# Patient Record
Sex: FEMALE | Race: OTHER | HISPANIC OR LATINO | Employment: OTHER | URBAN - METROPOLITAN AREA
[De-identification: names, ages, dates, MRNs, and addresses within clinical notes are randomized per-mention and may not be internally consistent; named-entity substitution may affect disease eponyms.]

---

## 2019-08-08 ENCOUNTER — HOSPITAL ENCOUNTER (INPATIENT)
Facility: HOSPITAL | Age: 64
LOS: 2 days | Discharge: HOME | DRG: 872 | End: 2019-08-10
Attending: EMERGENCY MEDICINE | Admitting: INTERNAL MEDICINE
Payer: COMMERCIAL

## 2019-08-08 ENCOUNTER — APPOINTMENT (EMERGENCY)
Dept: RADIOLOGY | Facility: HOSPITAL | Age: 64
DRG: 872 | End: 2019-08-08
Payer: COMMERCIAL

## 2019-08-08 ENCOUNTER — APPOINTMENT (INPATIENT)
Dept: CT IMAGING | Facility: HOSPITAL | Age: 64
DRG: 872 | End: 2019-08-08
Payer: COMMERCIAL

## 2019-08-08 DIAGNOSIS — A41.9 SEPSIS (HCC): ICD-10-CM

## 2019-08-08 DIAGNOSIS — N20.1 LEFT URETERAL CALCULUS: ICD-10-CM

## 2019-08-08 DIAGNOSIS — N39.0 UTI (URINARY TRACT INFECTION): Primary | ICD-10-CM

## 2019-08-08 PROBLEM — M79.7 FIBROMYALGIA: Status: ACTIVE | Noted: 2019-08-08

## 2019-08-08 PROBLEM — Z87.39 HISTORY OF OSTEOARTHRITIS: Status: ACTIVE | Noted: 2019-08-08

## 2019-08-08 PROBLEM — J84.10 PULMONARY FIBROSIS (HCC): Status: ACTIVE | Noted: 2019-08-08

## 2019-08-08 PROBLEM — M19.90 OSTEOARTHRITIS: Status: ACTIVE | Noted: 2019-08-08

## 2019-08-08 PROBLEM — Z86.718 HISTORY OF DVT (DEEP VEIN THROMBOSIS): Status: ACTIVE | Noted: 2019-08-08

## 2019-08-08 LAB
ALBUMIN SERPL BCP-MCNC: 3 G/DL (ref 3.5–5)
ALP SERPL-CCNC: 145 U/L (ref 46–116)
ALT SERPL W P-5'-P-CCNC: 29 U/L (ref 12–78)
ANION GAP SERPL CALCULATED.3IONS-SCNC: 17 MMOL/L (ref 4–13)
APTT PPP: 26 SECONDS (ref 23–37)
AST SERPL W P-5'-P-CCNC: 26 U/L (ref 5–45)
BACTERIA UR QL AUTO: ABNORMAL /HPF
BASOPHILS # BLD MANUAL: 0 THOUSAND/UL (ref 0–0.1)
BASOPHILS NFR MAR MANUAL: 0 % (ref 0–1)
BILIRUB SERPL-MCNC: 0.6 MG/DL (ref 0.2–1)
BILIRUB UR QL STRIP: NEGATIVE
BILIRUB UR QL STRIP: NEGATIVE
BUN SERPL-MCNC: 12 MG/DL (ref 5–25)
CALCIUM SERPL-MCNC: 8.9 MG/DL (ref 8.3–10.1)
CHLORIDE SERPL-SCNC: 103 MMOL/L (ref 100–108)
CLARITY UR: ABNORMAL
CLARITY UR: ABNORMAL
CO2 SERPL-SCNC: 19 MMOL/L (ref 21–32)
COLOR UR: ABNORMAL
COLOR UR: YELLOW
CREAT SERPL-MCNC: 1.27 MG/DL (ref 0.6–1.3)
EOSINOPHIL # BLD MANUAL: 0 THOUSAND/UL (ref 0–0.4)
EOSINOPHIL NFR BLD MANUAL: 0 % (ref 0–6)
ERYTHROCYTE [DISTWIDTH] IN BLOOD BY AUTOMATED COUNT: 12.6 % (ref 11.6–15.1)
GFR SERPL CREATININE-BSD FRML MDRD: 45 ML/MIN/1.73SQ M
GLUCOSE SERPL-MCNC: 174 MG/DL (ref 65–140)
GLUCOSE UR STRIP-MCNC: NEGATIVE MG/DL
GLUCOSE UR STRIP-MCNC: NEGATIVE MG/DL
HCT VFR BLD AUTO: 33.6 % (ref 34.8–46.1)
HGB BLD-MCNC: 11.1 G/DL (ref 11.5–15.4)
HGB UR QL STRIP.AUTO: ABNORMAL
HGB UR QL STRIP.AUTO: ABNORMAL
INR PPP: 1.06 (ref 0.84–1.19)
KETONES UR STRIP-MCNC: ABNORMAL MG/DL
KETONES UR STRIP-MCNC: ABNORMAL MG/DL
LACTATE SERPL-SCNC: 1.2 MMOL/L (ref 0.5–2)
LACTATE SERPL-SCNC: 5.8 MMOL/L (ref 0.5–2)
LEUKOCYTE ESTERASE UR QL STRIP: ABNORMAL
LEUKOCYTE ESTERASE UR QL STRIP: ABNORMAL
LYMPHOCYTES # BLD AUTO: 1.45 THOUSAND/UL (ref 0.6–4.47)
LYMPHOCYTES # BLD AUTO: 25 % (ref 14–44)
MCH RBC QN AUTO: 32.8 PG (ref 26.8–34.3)
MCHC RBC AUTO-ENTMCNC: 33 G/DL (ref 31.4–37.4)
MCV RBC AUTO: 99 FL (ref 82–98)
MONOCYTES # BLD AUTO: 0.06 THOUSAND/UL (ref 0–1.22)
MONOCYTES NFR BLD: 1 % (ref 4–12)
NEUTROPHILS # BLD MANUAL: 4.28 THOUSAND/UL (ref 1.85–7.62)
NEUTS BAND NFR BLD MANUAL: 10 % (ref 0–8)
NEUTS SEG NFR BLD AUTO: 64 % (ref 43–75)
NITRITE UR QL STRIP: NEGATIVE
NITRITE UR QL STRIP: NEGATIVE
NON-SQ EPI CELLS URNS QL MICRO: ABNORMAL /HPF
NRBC BLD AUTO-RTO: 0 /100 WBCS
PH UR STRIP.AUTO: 5.5 [PH] (ref 4.5–8)
PH UR STRIP.AUTO: 6 [PH]
PLATELET # BLD AUTO: 160 THOUSANDS/UL (ref 149–390)
PLATELET BLD QL SMEAR: ADEQUATE
PMV BLD AUTO: 9.8 FL (ref 8.9–12.7)
POTASSIUM SERPL-SCNC: 3.6 MMOL/L (ref 3.5–5.3)
PROCALCITONIN SERPL-MCNC: 0.59 NG/ML
PROT SERPL-MCNC: 8.7 G/DL (ref 6.4–8.2)
PROT UR STRIP-MCNC: ABNORMAL MG/DL
PROT UR STRIP-MCNC: ABNORMAL MG/DL
PROTHROMBIN TIME: 13.2 SECONDS (ref 11.6–14.5)
RBC # BLD AUTO: 3.38 MILLION/UL (ref 3.81–5.12)
RBC #/AREA URNS AUTO: ABNORMAL /HPF
RBC MORPH BLD: NORMAL
SODIUM SERPL-SCNC: 139 MMOL/L (ref 136–145)
SP GR UR STRIP.AUTO: 1.02 (ref 1–1.03)
SP GR UR STRIP.AUTO: >=1.03 (ref 1–1.03)
TOTAL CELLS COUNTED SPEC: 100
UROBILINOGEN UR QL STRIP.AUTO: 0.2 E.U./DL
UROBILINOGEN UR QL STRIP.AUTO: 0.2 E.U./DL
WBC # BLD AUTO: 5.78 THOUSAND/UL (ref 4.31–10.16)
WBC #/AREA URNS AUTO: ABNORMAL /HPF

## 2019-08-08 PROCEDURE — 85730 THROMBOPLASTIN TIME PARTIAL: CPT | Performed by: EMERGENCY MEDICINE

## 2019-08-08 PROCEDURE — 81001 URINALYSIS AUTO W/SCOPE: CPT | Performed by: EMERGENCY MEDICINE

## 2019-08-08 PROCEDURE — 85027 COMPLETE CBC AUTOMATED: CPT | Performed by: EMERGENCY MEDICINE

## 2019-08-08 PROCEDURE — 80053 COMPREHEN METABOLIC PANEL: CPT | Performed by: EMERGENCY MEDICINE

## 2019-08-08 PROCEDURE — 36415 COLL VENOUS BLD VENIPUNCTURE: CPT | Performed by: EMERGENCY MEDICINE

## 2019-08-08 PROCEDURE — 96360 HYDRATION IV INFUSION INIT: CPT

## 2019-08-08 PROCEDURE — 83605 ASSAY OF LACTIC ACID: CPT | Performed by: EMERGENCY MEDICINE

## 2019-08-08 PROCEDURE — 99223 1ST HOSP IP/OBS HIGH 75: CPT | Performed by: INTERNAL MEDICINE

## 2019-08-08 PROCEDURE — 84145 PROCALCITONIN (PCT): CPT | Performed by: EMERGENCY MEDICINE

## 2019-08-08 PROCEDURE — 85007 BL SMEAR W/DIFF WBC COUNT: CPT | Performed by: EMERGENCY MEDICINE

## 2019-08-08 PROCEDURE — 85610 PROTHROMBIN TIME: CPT | Performed by: EMERGENCY MEDICINE

## 2019-08-08 PROCEDURE — 87040 BLOOD CULTURE FOR BACTERIA: CPT | Performed by: EMERGENCY MEDICINE

## 2019-08-08 PROCEDURE — 87186 SC STD MICRODIL/AGAR DIL: CPT | Performed by: EMERGENCY MEDICINE

## 2019-08-08 PROCEDURE — 87086 URINE CULTURE/COLONY COUNT: CPT | Performed by: EMERGENCY MEDICINE

## 2019-08-08 PROCEDURE — 87077 CULTURE AEROBIC IDENTIFY: CPT | Performed by: EMERGENCY MEDICINE

## 2019-08-08 PROCEDURE — 99285 EMERGENCY DEPT VISIT HI MDM: CPT | Performed by: EMERGENCY MEDICINE

## 2019-08-08 PROCEDURE — 74177 CT ABD & PELVIS W/CONTRAST: CPT

## 2019-08-08 PROCEDURE — 99285 EMERGENCY DEPT VISIT HI MDM: CPT

## 2019-08-08 PROCEDURE — 71046 X-RAY EXAM CHEST 2 VIEWS: CPT

## 2019-08-08 RX ORDER — SODIUM CHLORIDE 9 MG/ML
75 INJECTION, SOLUTION INTRAVENOUS CONTINUOUS
Status: DISCONTINUED | OUTPATIENT
Start: 2019-08-08 | End: 2019-08-10

## 2019-08-08 RX ORDER — ACETAMINOPHEN 325 MG/1
975 TABLET ORAL ONCE
Status: COMPLETED | OUTPATIENT
Start: 2019-08-08 | End: 2019-08-08

## 2019-08-08 RX ORDER — ONDANSETRON 2 MG/ML
4 INJECTION INTRAMUSCULAR; INTRAVENOUS EVERY 8 HOURS PRN
Status: DISCONTINUED | OUTPATIENT
Start: 2019-08-08 | End: 2019-08-10 | Stop reason: HOSPADM

## 2019-08-08 RX ADMIN — IODIXANOL 100 ML: 320 INJECTION, SOLUTION INTRAVASCULAR at 21:57

## 2019-08-08 RX ADMIN — CEFTRIAXONE SODIUM 1000 MG: 10 INJECTION, POWDER, FOR SOLUTION INTRAVENOUS at 20:35

## 2019-08-08 RX ADMIN — IOHEXOL 50 ML: 240 INJECTION, SOLUTION INTRATHECAL; INTRAVASCULAR; INTRAVENOUS; ORAL at 21:57

## 2019-08-08 RX ADMIN — ACETAMINOPHEN 975 MG: 325 TABLET ORAL at 16:56

## 2019-08-08 RX ADMIN — SODIUM CHLORIDE 1000 ML: 0.9 INJECTION, SOLUTION INTRAVENOUS at 16:52

## 2019-08-08 RX ADMIN — SODIUM CHLORIDE 75 ML/HR: 0.9 INJECTION, SOLUTION INTRAVENOUS at 20:39

## 2019-08-08 RX ADMIN — CEFTRIAXONE SODIUM 1000 MG: 10 INJECTION, POWDER, FOR SOLUTION INTRAVENOUS at 18:17

## 2019-08-08 RX ADMIN — SODIUM CHLORIDE 1000 ML: 0.9 INJECTION, SOLUTION INTRAVENOUS at 19:22

## 2019-08-08 NOTE — ASSESSMENT & PLAN NOTE
-con't Esbriet (267mg) 2 tabs tid  -pt follows with Dr Sandhu Neighbor (pulm); follow up out patient

## 2019-08-08 NOTE — PROGRESS NOTES
Progress Note - Lorna Whatley 1955, 59 y o  female MRN: [id number]    Unit/Bed#: -01 Encounter: [id number]    Primary Care Provider: Nayla Dickerson MD   Date and time admitted to hospital: 8/8/2019  4:25 PM        * Sepsis due to Pyelonephritis vs UTI  Assessment & Plan  -pt presents with worsening L  Flank pain; T 103, , RR 22  -UA: large leukocytes, mod blood, ketones, protein, cloudy  -LA 5 8, con't to trend  -Sepsis protocol intiated; pt given 2L NS in ED and started on Rocephin IV 1g (with noted improvement in symptoms)  -Costovertebral tenderness positive on PE --> pending Abd/Pel CT with contrast  -con't IVF, con't IV Rocephin  History of DVT with PE  Assessment & Plan  -LLE DVT and Pulmonary embolism in 2013  -pt denies any SOB  -con't Lovenox for DVT ppx    Pulmonary fibrosis (HCC)  Assessment & Plan  -con't Esbriet (267mg) 2 tabs tid  -pt follows with Dr Rohit Harden (pul); follow up out patient    Osteoarthritis  Assessment & Plan  -hx of b/l hip replacement (R  2014, L  08/2018)  -con't home meds (Alendronate, Vit D)  -follow up with out patient Rheumatologist (Dr Yung Headley? ? )  Fibromyalgia  Assessment & Plan  - pt complains of chronic Lower extremity pain (rule out DVT?)  - con't with home meds; monitor       VTE Prophylaxis: Enoxaparin (Lovenox)  / sequential compression device   Code Status: Full Code  POLST: No discussed  Discussion with family: Yes    Anticipated Length of Stay:  Patient will be admitted on an Inpatient basis with an anticipated length of stay of  1- 2 midnights  Justification for Hospital Stay: Sepsis    Total Time for Visit, including Counseling / Coordination of Care: 45 minutes  Greater than 50% of this total time spent on direct patient counseling and coordination of care  Chief Complaint:   Left flank pain    History of Present Illness:    Lorna Whatley is a 59 y o  female with a PMH of pulm   Fibrosis, OA, osteoperosis, fibromyalgia, pneumonia and DVT with PE (2013) who presented to the ED with a cc of Left sided flank pain  The pain started last Wed, was intermittent with a milder intensity (3/10) and associated with a fever of 102F on Friday  This morning, she work up with worsening pain 10/10, that was constant, non-radiating and assoc with fever, chills and tremors  She denies any urinary urgency/frequency/pain/hematuria  Review of Systems:    Review of Systems   Constitutional: Positive for chills and fever  Negative for diaphoresis  HENT: Negative for congestion and sore throat  Eyes: Negative for photophobia  Respiratory: Positive for cough  Negative for chest tightness, shortness of breath and wheezing  Cardiovascular: Positive for leg swelling  Negative for chest pain  Mildly on the LLE   Gastrointestinal: Negative for abdominal pain, constipation, diarrhea, nausea and vomiting  Genitourinary: Positive for flank pain  Negative for dysuria, frequency, hematuria, pelvic pain and urgency  Musculoskeletal: Positive for arthralgias  Neurological: Negative for dizziness, weakness, light-headedness and headaches  Hematological: Does not bruise/bleed easily  Psychiatric/Behavioral: Negative for confusion  Past Medical and Surgical History:     Past Medical History:   Diagnosis Date    Asthma        No past surgical history on file      Meds/Allergies:    Prior to Admission medications    Not on File     Able to review some of pts home meds, see A&P    Allergies: No Known Allergies    Social History:     Marital Status: Single   Occupation: Day care worker  Patient Pre-hospital Living Situation: Lives with   Patient Pre-hospital Level of Mobility: Ambulates independently  Patient Pre-hospital Diet Restrictions: None  Substance Use History:   Social History     Substance and Sexual Activity   Alcohol Use Not Currently    Frequency: Never     Social History     Tobacco Use   Smoking Status Never Smoker   Smokeless Tobacco Never Used     Social History     Substance and Sexual Activity   Drug Use Never       Family History:    non-contributory    Physical Exam:     Vitals:   Blood Pressure: 121/57 (08/08/19 1853)  Pulse: 92 (08/08/19 1853)  Temperature: 99 6 °F (37 6 °C) (08/08/19 1853)  Temp Source: Oral (08/08/19 1853)  Respirations: 18 (08/08/19 1853)  Height: 5' 3" (160 cm) (08/08/19 1853)  Weight - Scale: 62 8 kg (138 lb 7 2 oz) (08/08/19 1853)  SpO2: 94 % (08/08/19 1853)    Physical Exam   Constitutional: She is oriented to person, place, and time  She appears well-developed and well-nourished  HENT:   Head: Normocephalic and atraumatic  Eyes: Conjunctivae and EOM are normal    Neck: Normal range of motion  Neck supple  Cardiovascular:   tachycardia   Pulmonary/Chest: Effort normal and breath sounds normal    Abdominal: Soft  Bowel sounds are normal    Musculoskeletal: She exhibits edema  Trace pitting edema on RLE, tenderness to light palpation, pulses intact b/l  Neurological: She is alert and oriented to person, place, and time  Skin: Skin is warm and dry  Capillary refill takes less than 2 seconds  Psychiatric: She has a normal mood and affect  Additional Data:     Lab Results: I have personally reviewed pertinent reports        Results from last 7 days   Lab Units 08/08/19  1645   WBC Thousand/uL 5 78   HEMOGLOBIN g/dL 11 1*   HEMATOCRIT % 33 6*   PLATELETS Thousands/uL 160   BANDS PCT % 10*   LYMPHO PCT % 25   MONO PCT % 1*   EOS PCT % 0     Results from last 7 days   Lab Units 08/08/19  1645   SODIUM mmol/L 139   POTASSIUM mmol/L 3 6   CHLORIDE mmol/L 103   CO2 mmol/L 19*   BUN mg/dL 12   CREATININE mg/dL 1 27   ANION GAP mmol/L 17*   CALCIUM mg/dL 8 9   ALBUMIN g/dL 3 0*   TOTAL BILIRUBIN mg/dL 0 60   ALK PHOS U/L 145*   ALT U/L 29   AST U/L 26   GLUCOSE RANDOM mg/dL 174*     Results from last 7 days   Lab Units 08/08/19  1645   INR  1 06             Results from last 7 days   Lab Units 08/08/19  1902 08/08/19  1645   LACTIC ACID mmol/L 1 2 5 8*       Imaging: I have personally reviewed pertinent reports  XR chest 2 views    (Results Pending)   CT abdomen pelvis w contrast    (Results Pending)       EKG, Pathology, and Other Studies Reviewed on Admission:   · EKG: wnl    Allscripts / Epic Records Reviewed: Yes     ** Please Note: This note has been constructed using a voice recognition system   **

## 2019-08-08 NOTE — LETTER
Nathaly Banegas [facility] [address]  Dept: [phone]    August 10, 2019     Patient: Fabricio Mireles   YOB: 1955   Date of Visit: 8/8/2019       To Whom it May Concern:    Fabricio Mireles is under my professional care  She was seen in the hospital from 8/8/2019   to 08/10/19  She may return to work on 08/17/19 without limitations  If you have any questions or concerns, please don't hesitate to call           Sincerely,          Gambia C Holter, DO

## 2019-08-08 NOTE — ASSESSMENT & PLAN NOTE
-pt presents with worsening L  Flank pain; T 103, , RR 22  -UA: large leukocytes, mod blood, ketones, protein, cloudy  -LA 5 8, con't to trend  -Sepsis protocol intiated; pt given 2L NS in ED and started on Rocephin IV 1g (with noted improvement in symptoms)  -Costovertebral tenderness positive on PE --> pending Abd/Pel CT with contrast  -con't IVF, con't IV Rocephin

## 2019-08-08 NOTE — ASSESSMENT & PLAN NOTE
-hx of b/l hip replacement (R  2014, L  08/2018)  -con't home meds (Alendronate, Vit D)  -follow up with out patient Rheumatologist (Dr Franco Collazo? ? )

## 2019-08-08 NOTE — ED PROVIDER NOTES
History  Chief Complaint   Patient presents with    Flank Pain     Pt  with left flank pain, fever, chills, and tremors since approx  3 am  Denies urinary complaints  58 y/o female presents today with fever and left flank pain which started this morning around 3am   Pain does not radiate  No exacerbating or alleviating factors  Denies urinary symptoms  Has not taken anything for her pain  Temp was 103 at triage  History provided by:  Patient  Flank Pain   Pain location:  L flank  Pain quality: aching and cramping    Pain radiates to:  Does not radiate  Pain severity:  Moderate  Onset quality:  Sudden  Duration:  12 hours  Timing:  Constant  Progression:  Worsening  Chronicity:  New  Context: awakening from sleep    Context: not suspicious food intake    Relieved by:  None tried  Worsened by:  Nothing  Ineffective treatments:  None tried      None       Past Medical History:   Diagnosis Date    Asthma        No past surgical history on file  No family history on file  I have reviewed and agree with the history as documented  Social History     Tobacco Use    Smoking status: Never Smoker    Smokeless tobacco: Never Used   Substance Use Topics    Alcohol use: Not Currently     Frequency: Never    Drug use: Never        Review of Systems   Genitourinary: Positive for flank pain  Physical Exam  Physical Exam   Constitutional: She is oriented to person, place, and time  She appears well-developed and well-nourished  She appears distressed (appears uncomfortable)  HENT:   Head: Normocephalic and atraumatic  Mouth/Throat: Uvula is midline, oropharynx is clear and moist and mucous membranes are normal  No tonsillar exudate  Eyes: Pupils are equal, round, and reactive to light  Neck: Normal range of motion  Neck supple  Cardiovascular: Regular rhythm  Mildly tachycardic at 110 bpm   Pulmonary/Chest: Effort normal and breath sounds normal    Abdominal: Soft   Bowel sounds are normal  There is no tenderness  There is no rebound and no guarding  Musculoskeletal: She exhibits no edema, tenderness or deformity  Neurological: She is alert and oriented to person, place, and time  Patient moving all extremities equally, no focal neuro deficits noted  Skin: Skin is warm and dry  Capillary refill takes less than 2 seconds  Psychiatric: She has a normal mood and affect  Nursing note and vitals reviewed        Vital Signs  ED Triage Vitals [08/08/19 1633]   Temperature Pulse Respirations Blood Pressure SpO2   (!) 103 °F (39 4 °C) (!) 127 22 (!) 181/74 96 %      Temp Source Heart Rate Source Patient Position - Orthostatic VS BP Location FiO2 (%)   Oral Monitor Sitting Right arm --      Pain Score       8           Vitals:    08/08/19 1633 08/08/19 1730 08/08/19 1824 08/08/19 1853   BP: (!) 181/74 138/61 143/83 121/57   Pulse: (!) 127 94 97 92   Patient Position - Orthostatic VS: Sitting Lying  Lying         Visual Acuity      ED Medications  Medications   cefTRIAXone (ROCEPHIN) 1,000 mg in dextrose 5 % 50 mL IVPB (has no administration in time range)   sodium chloride 0 9 % bolus 1,000 mL (has no administration in time range)     Followed by   sodium chloride 0 9 % bolus 1,000 mL (has no administration in time range)   enoxaparin (LOVENOX) subcutaneous injection 40 mg (has no administration in time range)   iohexol (OMNIPAQUE) 240 MG/ML solution 50 mL (has no administration in time range)   sodium chloride 0 9 % bolus 1,000 mL (1,000 mL Intravenous New Bag 8/8/19 1652)     Followed by   sodium chloride 0 9 % bolus 1,000 mL (1,000 mL Intravenous Bolus 8/8/19 1922)   acetaminophen (TYLENOL) tablet 975 mg (975 mg Oral Given 8/8/19 1656)   ceftriaxone (ROCEPHIN) 1 g/50 mL in dextrose IVPB (1,000 mg Intravenous New Bag 8/8/19 1817)       Diagnostic Studies  Results Reviewed     Procedure Component Value Units Date/Time    Lactic acid x2 [[id number]]  (Normal) Collected:  08/08/19 1902    Lab Status:  Final result Specimen:  Blood from Arm, Left Updated:  08/08/19 1933     LACTIC ACID 1 2 mmol/L     Narrative:       Result may be elevated if tourniquet was used during collection  Urine Microscopic [[id number]]  (Abnormal) Collected:  08/08/19 1754    Lab Status:  Final result Specimen:  Urine, Clean Catch Updated:  08/08/19 1819     RBC, UA       Field obscured, unable to enumerate     /hpf     WBC, UA Innumerable /hpf      Epithelial Cells Occasional /hpf      Bacteria, UA Moderate /hpf     Urine culture [[id number]] Collected:  08/08/19 1754    Lab Status: In process Specimen:  Urine, Clean Catch Updated:  08/08/19 1819    UA w Reflex to Microscopic w Reflex to Culture [[id number]]  (Abnormal) Collected:  08/08/19 1754    Lab Status:  Final result Specimen:  Urine, Clean Catch Updated:  08/08/19 1807     Color, UA Yellow     Clarity, UA Slightly Cloudy     Specific Gravity, UA >=1 030     pH, UA 6 0     Leukocytes, UA Large     Nitrite, UA Negative     Protein, UA 30 (1+) mg/dl      Glucose, UA Negative mg/dl      Ketones, UA 15 (1+) mg/dl      Urobilinogen, UA 0 2 E U /dl      Bilirubin, UA Negative     Blood, UA Moderate    ED Urine Macroscopic [[id number]]  (Abnormal) Collected:  08/08/19 1802    Lab Status:  Final result Specimen:  Urine Updated:  08/08/19 1751     Color, UA Elvi     Clarity, UA Cloudy     pH, UA 5 5     Leukocytes, UA Large     Nitrite, UA Negative     Protein,  (2+) mg/dl      Glucose, UA Negative mg/dl      Ketones, UA 40 (2+) mg/dl      Urobilinogen, UA 0 2 E U /dl      Bilirubin, UA Negative     Blood, UA Moderate     Specific Gravity, UA 1 025    Narrative:       CLINITEK RESULT    Lactic acid x2 [[id number]]  (Abnormal) Collected:  08/08/19 1645    Lab Status:  Final result Specimen:  Blood from Arm, Right Updated:  08/08/19 1731     LACTIC ACID 5 8 mmol/L     Narrative:       Result may be elevated if tourniquet was used during collection      CBC and differential [[id number]]  (Abnormal) Collected:  08/08/19 1645    Lab Status:  Final result Specimen:  Blood from Arm, Right Updated:  08/08/19 1724     WBC 5 78 Thousand/uL      RBC 3 38 Million/uL      Hemoglobin 11 1 g/dL      Hematocrit 33 6 %      MCV 99 fL      MCH 32 8 pg      MCHC 33 0 g/dL      RDW 12 6 %      MPV 9 8 fL      Platelets 748 Thousands/uL      nRBC 0 /100 WBCs     Narrative: This is an appended report  These results have been appended to a previously verified report      Comprehensive metabolic panel [[id number]]  (Abnormal) Collected:  08/08/19 1645    Lab Status:  Final result Specimen:  Blood from Arm, Right Updated:  08/08/19 1714     Sodium 139 mmol/L      Potassium 3 6 mmol/L      Chloride 103 mmol/L      CO2 19 mmol/L      ANION GAP 17 mmol/L      BUN 12 mg/dL      Creatinine 1 27 mg/dL      Glucose 174 mg/dL      Calcium 8 9 mg/dL      AST 26 U/L      ALT 29 U/L      Alkaline Phosphatase 145 U/L      Total Protein 8 7 g/dL      Albumin 3 0 g/dL      Total Bilirubin 0 60 mg/dL      eGFR 45 ml/min/1 73sq m     Narrative:       [place] guidelines for Chronic Kidney Disease (CKD):     Stage 1 with normal or high GFR (GFR > 90 mL/min/1 73 square meters)    Stage 2 Mild CKD (GFR = 60-89 mL/min/1 73 square meters)    Stage 3A Moderate CKD (GFR = 45-59 mL/min/1 73 square meters)    Stage 3B Moderate CKD (GFR = 30-44 mL/min/1 73 square meters)    Stage 4 Severe CKD (GFR = 15-29 mL/min/1 73 square meters)    Stage 5 End Stage CKD (GFR <15 mL/min/1 73 square meters)  Note: GFR calculation is accurate only with a steady state creatinine    Protime-INR [[id number]]  (Normal) Collected:  08/08/19 1645    Lab Status:  Final result Specimen:  Blood from Arm, Right Updated:  08/08/19 1708     Protime 13 2 seconds      INR 1 06    APTT [[id number]]  (Normal) Collected:  08/08/19 1645    Lab Status:  Final result Specimen:  Blood from Arm, Right Updated:  08/08/19 1708     PTT 26 seconds     Blood culture #1 [[id number]] Collected:  08/08/19 1657    Lab Status: In process Specimen:  Blood from Hand, Left Updated:  08/08/19 1700    Procalcitonin [[id number]] Collected:  08/08/19 1645    Lab Status: In process Specimen:  Blood from Arm, Right Updated:  08/08/19 1654    Blood culture #2 [[id number]] Collected:  08/08/19 1645    Lab Status: In process Specimen:  Blood from Arm, Right Updated:  08/08/19 1653                 XR chest 2 views    (Results Pending)   CT abdomen pelvis w contrast    (Results Pending)              Procedures  Procedures       ED Course                   Initial Sepsis Screening     [address] Name 08/08/19 1635                Is the patient's history suggestive of a new or worsening infection? (!) Yes (Proceed)  -KD        Suspected source of infection  urinary tract infection  -KD        Are two or more of the following signs & symptoms of infection both present and new to the patient?         Indicate SIRS criteria  Hyperthemia > 38 3C (100 9F); Tachypnea > 20 resp per min; Tachycardia > 90 bpm  -KD        If the answer is yes to both questions, suspicion of sepsis is present          If severe sepsis is present AND tissue hypoperfusion perists in the hour after fluid resuscitation or lactate > 4, the patient meets criteria for SEPTIC SHOCK          Are any of the following organ dysfunction criteria present within 6 hours of suspected infection and SIRS criteria that are NOT considered to be chronic conditions?         Organ dysfunction          Date of presentation of severe sepsis          Time of presentation of severe sepsis          Tissue hypoperfusion persists in the hour after crystalloid fluid administration, evidenced, by either:          Was hypotension present within one hour of the conclusion of crystalloid fluid administration?           Date of presentation of septic shock          Time of presentation of septic shock            User Key  (r) = Recorded By, (t) = Taken By, (c) = Cosigned By    [address] Name Provider Type    WILD Truong DO Physician                  MDM  Number of Diagnoses or Management Options  Sepsis [facility]):   UTI (urinary tract infection):   Diagnosis management comments: 5:38 PM  Lactate 5 8  No leukocytosis but 10% bandemia  Likely urinary source but hasn't urinated yet  Sepsis alert called  1800  UA shows blood and leukocytes  Pain has resolved with tylenol and IVF  Vitals improving with IVF and Tylenol  Likely urosepsis  MDM: Patient presents to the Emergency Department and was diagnosed with acute UTI with sepsis  This is a new problem that will require additional planned work-up in a hospitalized setting  Clinical laboratory testing, radiology imaging, and medical testing (EKG) were ordered  I independently reviewed the radiologic imaging, EKG, and laboratory studies  This case is considered high risk secondary to the above listed disease process that poses a threat to bodily function that requires further diagnostic testing and management which may include the administration of parenteral controlled substances  Discussed with EUGENIO  We had a detailed discussion of the patient's condition and case,  including need for admission  Accepts to his/her service  Bed request/bridging orders placed             Amount and/or Complexity of Data Reviewed  Clinical lab tests: ordered and reviewed  Tests in the medicine section of CPT®: reviewed and ordered  Review and summarize past medical records: yes  Discuss the patient with other providers: yes  Independent visualization of images, tracings, or specimens: yes    Risk of Complications, Morbidity, and/or Mortality  Presenting problems: high  Diagnostic procedures: high  Management options: high    Patient Progress  Patient progress: stable      Disposition  Final diagnoses:   UTI (urinary tract infection)   Sepsis ([facility] [address] )     Time reflects when diagnosis was documented in both MDM as applicable and the Disposition within this note     Time User Action Codes Description Comment    8/8/2019  6:04 PM Mata Davila Add [N39 0] UTI (urinary tract infection)     8/8/2019  6:05 PM Dewar, Karyle Miser Add [A41 9] Sepsis [facility])       ED Disposition     ED Disposition Condition Date/Time Comment    Admit Stable Thu Aug 8, 2019  6:05 PM Case was discussed with EUGENIO and the patient's admission status was agreed to be Admission Status: inpatient status to the service of Dr Char White    Follow-up Information    None         There are no discharge medications for this patient  No discharge procedures on file      ED Provider  Electronically Signed by           Oneil Santana DO  08/08/19 2014

## 2019-08-09 ENCOUNTER — ANESTHESIA (INPATIENT)
Dept: PERIOP | Facility: HOSPITAL | Age: 64
DRG: 872 | End: 2019-08-09
Payer: COMMERCIAL

## 2019-08-09 ENCOUNTER — ANESTHESIA EVENT (INPATIENT)
Dept: PERIOP | Facility: HOSPITAL | Age: 64
DRG: 872 | End: 2019-08-09
Payer: COMMERCIAL

## 2019-08-09 ENCOUNTER — APPOINTMENT (INPATIENT)
Dept: RADIOLOGY | Facility: HOSPITAL | Age: 64
DRG: 872 | End: 2019-08-09
Payer: COMMERCIAL

## 2019-08-09 PROBLEM — N39.0 UTI (URINARY TRACT INFECTION): Status: ACTIVE | Noted: 2019-08-08

## 2019-08-09 PROBLEM — N20.1 LEFT URETERAL CALCULUS: Status: ACTIVE | Noted: 2019-08-08

## 2019-08-09 LAB
ALBUMIN SERPL BCP-MCNC: 2.4 G/DL (ref 3.5–5)
ALP SERPL-CCNC: 121 U/L (ref 46–116)
ALT SERPL W P-5'-P-CCNC: 28 U/L (ref 12–78)
ANION GAP SERPL CALCULATED.3IONS-SCNC: 10 MMOL/L (ref 4–13)
AST SERPL W P-5'-P-CCNC: 25 U/L (ref 5–45)
BILIRUB SERPL-MCNC: 0.2 MG/DL (ref 0.2–1)
BUN SERPL-MCNC: 7 MG/DL (ref 5–25)
CALCIUM SERPL-MCNC: 7.9 MG/DL (ref 8.3–10.1)
CHLORIDE SERPL-SCNC: 109 MMOL/L (ref 100–108)
CO2 SERPL-SCNC: 23 MMOL/L (ref 21–32)
CREAT SERPL-MCNC: 0.8 MG/DL (ref 0.6–1.3)
GFR SERPL CREATININE-BSD FRML MDRD: 78 ML/MIN/1.73SQ M
GLUCOSE SERPL-MCNC: 91 MG/DL (ref 65–140)
POTASSIUM SERPL-SCNC: 4 MMOL/L (ref 3.5–5.3)
PROT SERPL-MCNC: 6.7 G/DL (ref 6.4–8.2)
SODIUM SERPL-SCNC: 142 MMOL/L (ref 136–145)

## 2019-08-09 PROCEDURE — NC001 PR NO CHARGE: Performed by: UROLOGY

## 2019-08-09 PROCEDURE — 80053 COMPREHEN METABOLIC PANEL: CPT | Performed by: PSYCHIATRY & NEUROLOGY

## 2019-08-09 PROCEDURE — 74018 RADEX ABDOMEN 1 VIEW: CPT

## 2019-08-09 PROCEDURE — 0T778DZ DILATION OF LEFT URETER WITH INTRALUMINAL DEVICE, VIA NATURAL OR ARTIFICIAL OPENING ENDOSCOPIC: ICD-10-PCS | Performed by: UROLOGY

## 2019-08-09 PROCEDURE — 52332 CYSTOSCOPY AND TREATMENT: CPT | Performed by: UROLOGY

## 2019-08-09 PROCEDURE — 99232 SBSQ HOSP IP/OBS MODERATE 35: CPT | Performed by: INTERNAL MEDICINE

## 2019-08-09 PROCEDURE — BT171ZZ FLUOROSCOPY OF LEFT URETER USING LOW OSMOLAR CONTRAST: ICD-10-PCS | Performed by: UROLOGY

## 2019-08-09 PROCEDURE — 99254 IP/OBS CNSLTJ NEW/EST MOD 60: CPT | Performed by: NURSE PRACTITIONER

## 2019-08-09 PROCEDURE — C1769 GUIDE WIRE: HCPCS | Performed by: UROLOGY

## 2019-08-09 PROCEDURE — C2617 STENT, NON-COR, TEM W/O DEL: HCPCS | Performed by: UROLOGY

## 2019-08-09 DEVICE — STENT URETERAL 6 FR 26CM INLAY OPTIMA
Type: IMPLANTABLE DEVICE | Site: URETER | Status: NON-FUNCTIONAL
Removed: 2019-09-04

## 2019-08-09 RX ORDER — FENTANYL CITRATE 50 UG/ML
INJECTION, SOLUTION INTRAMUSCULAR; INTRAVENOUS AS NEEDED
Status: DISCONTINUED | OUTPATIENT
Start: 2019-08-09 | End: 2019-08-09 | Stop reason: SURG

## 2019-08-09 RX ORDER — ONDANSETRON 2 MG/ML
4 INJECTION INTRAMUSCULAR; INTRAVENOUS EVERY 4 HOURS PRN
Status: DISCONTINUED | OUTPATIENT
Start: 2019-08-09 | End: 2019-08-09 | Stop reason: HOSPADM

## 2019-08-09 RX ORDER — FENTANYL CITRATE/PF 50 MCG/ML
25 SYRINGE (ML) INJECTION
Status: DISCONTINUED | OUTPATIENT
Start: 2019-08-09 | End: 2019-08-09 | Stop reason: HOSPADM

## 2019-08-09 RX ORDER — MAGNESIUM HYDROXIDE 1200 MG/15ML
LIQUID ORAL AS NEEDED
Status: DISCONTINUED | OUTPATIENT
Start: 2019-08-09 | End: 2019-08-09 | Stop reason: HOSPADM

## 2019-08-09 RX ORDER — ONDANSETRON 2 MG/ML
INJECTION INTRAMUSCULAR; INTRAVENOUS AS NEEDED
Status: DISCONTINUED | OUTPATIENT
Start: 2019-08-09 | End: 2019-08-09 | Stop reason: SURG

## 2019-08-09 RX ORDER — SODIUM CHLORIDE, SODIUM LACTATE, POTASSIUM CHLORIDE, CALCIUM CHLORIDE 600; 310; 30; 20 MG/100ML; MG/100ML; MG/100ML; MG/100ML
INJECTION, SOLUTION INTRAVENOUS CONTINUOUS PRN
Status: DISCONTINUED | OUTPATIENT
Start: 2019-08-09 | End: 2019-08-09 | Stop reason: SURG

## 2019-08-09 RX ORDER — PROPOFOL 10 MG/ML
INJECTION, EMULSION INTRAVENOUS AS NEEDED
Status: DISCONTINUED | OUTPATIENT
Start: 2019-08-09 | End: 2019-08-09 | Stop reason: SURG

## 2019-08-09 RX ORDER — DIPHENHYDRAMINE HYDROCHLORIDE 50 MG/ML
12.5 INJECTION INTRAMUSCULAR; INTRAVENOUS ONCE AS NEEDED
Status: DISCONTINUED | OUTPATIENT
Start: 2019-08-09 | End: 2019-08-09 | Stop reason: HOSPADM

## 2019-08-09 RX ADMIN — SODIUM CHLORIDE 75 ML/HR: 0.9 INJECTION, SOLUTION INTRAVENOUS at 10:57

## 2019-08-09 RX ADMIN — LIDOCAINE HYDROCHLORIDE 60 MG: 20 INJECTION, SOLUTION INTRAVENOUS at 16:58

## 2019-08-09 RX ADMIN — ONDANSETRON 4 MG: 2 INJECTION INTRAMUSCULAR; INTRAVENOUS at 17:02

## 2019-08-09 RX ADMIN — SODIUM CHLORIDE, SODIUM LACTATE, POTASSIUM CHLORIDE, AND CALCIUM CHLORIDE: .6; .31; .03; .02 INJECTION, SOLUTION INTRAVENOUS at 16:53

## 2019-08-09 RX ADMIN — ENOXAPARIN SODIUM 40 MG: 40 INJECTION SUBCUTANEOUS at 09:16

## 2019-08-09 RX ADMIN — CEFTRIAXONE SODIUM 1000 MG: 10 INJECTION, POWDER, FOR SOLUTION INTRAVENOUS at 12:27

## 2019-08-09 RX ADMIN — PROPOFOL 200 MG: 10 INJECTION, EMULSION INTRAVENOUS at 16:58

## 2019-08-09 RX ADMIN — FENTANYL CITRATE 25 MCG: 50 INJECTION INTRAMUSCULAR; INTRAVENOUS at 17:01

## 2019-08-09 RX ADMIN — FENTANYL CITRATE 25 MCG: 50 INJECTION INTRAMUSCULAR; INTRAVENOUS at 17:02

## 2019-08-09 NOTE — QUICK NOTE
Stent placed on the left ureter without difficulty  When she is afebrile she can be discharged home from my standpoint  My office has been contacted to contact her to set her up for an appointment to plan future surgery in the form cystoscopy, left ureteroscopy laser lithotripsy  Discharge instructions left on the chart

## 2019-08-09 NOTE — INTERVAL H&P NOTE
H&P reviewed  After examining the patient I find no changes in the patients condition since the H&P had been written  Plan cysto, left ureteral stent placement- she understands this is a staged procedure  Risks explained, consent obtained

## 2019-08-09 NOTE — PLAN OF CARE
Problem: PAIN - ADULT  Goal: Verbalizes/displays adequate comfort level or baseline comfort level  Description  Interventions:  - Encourage patient to monitor pain and request assistance  - Assess pain using appropriate pain scale  - Administer analgesics based on type and severity of pain and evaluate response  - Implement non-pharmacological measures as appropriate and evaluate response  - Consider cultural and social influences on pain and pain management  - Notify physician/advanced practitioner if interventions unsuccessful or patient reports new pain  Outcome: Progressing     Problem: INFECTION - ADULT  Goal: Absence or prevention of progression during hospitalization  Description  INTERVENTIONS:  - Assess and monitor for signs and symptoms of infection  - Monitor lab/diagnostic results  - Monitor all insertion sites, i e  indwelling lines, tubes, and drains  - Monitor endotracheal (as able) and nasal secretions for changes in amount and color  - [place] appropriate cooling/warming therapies per order  - Administer medications as ordered  - Instruct and encourage patient and family to use good hand hygiene technique  - Identify and instruct in appropriate isolation precautions for identified infection/condition  Outcome: Progressing

## 2019-08-09 NOTE — H&P
Please see progress note dated 8/8/19 by Dr Crystal Israel for full H&P    Pt seen and examined personally with Dr Crystal Israel on 8/8/19 820pm  Agree with listed H&P     in addition  Acute Lt flank pain c fever and chills c LA 5 8 - need to r/o ureteral obstruction     O/e AAO*3, nml heart and lung sounds, abdo soft BS+     LA rpt 1 2  Will need CT Abdomen c contrast to r/o obstruction/ pyelo/ abscess  Note Hematuria on UA     Plan - continue IVF and IV Abx

## 2019-08-09 NOTE — UTILIZATION REVIEW
Continued Stay Review    Date: 8/9/2019                        Current Patient Class: inpatient  Current Level of Care: med surg     HPI:64 y o  female initially admitted on 8/8/2019 inpatient due to sepsis due to pyelonephritis vs UTI  CT of the abdomen and pelvis were positive for left obstructing ureteral calculus approximately 4 mm with resultant hydronephrosis  T-max 103° with tachycardia in the 120s  UA positive for infection  IVF and IV antibiotics in progress  Assessment/Plan: today 8/9 Patient to go to OR for left ureteral calculus with left hydronephrosis  IVF, IV antibiotics in progress  Cultures are pending      Procedure scheduled for 1700:  Cystoscopy retrograde pyelogram with insertion stent ureteral (72145 CPT)    Pertinent Labs/Diagnostic Results:   Results from last 7 days   Lab Units 08/08/19  1645   WBC Thousand/uL 5 78   HEMOGLOBIN g/dL 11 1*   HEMATOCRIT % 33 6*   PLATELETS Thousands/uL 160   BANDS PCT % 10*     Results from last 7 days   Lab Units 08/09/19  0533 08/08/19  1645   SODIUM mmol/L 142 139   POTASSIUM mmol/L 4 0 3 6   CHLORIDE mmol/L 109* 103   CO2 mmol/L 23 19*   ANION GAP mmol/L 10 17*   BUN mg/dL 7 12   CREATININE mg/dL 0 80 1 27   EGFR ml/min/1 73sq m 78 45   CALCIUM mg/dL 7 9* 8 9     Results from last 7 days   Lab Units 08/09/19  0533 08/08/19  1645   AST U/L 25 26   ALT U/L 28 29   ALK PHOS U/L 121* 145*   TOTAL PROTEIN g/dL 6 7 8 7*   ALBUMIN g/dL 2 4* 3 0*   TOTAL BILIRUBIN mg/dL 0 20 0 60         Results from last 7 days   Lab Units 08/09/19  0533 08/08/19  1645   GLUCOSE RANDOM mg/dL 91 174*     Results from last 7 days   Lab Units 08/08/19  1645   PROTIME seconds 13 2   INR  1 06   PTT seconds 26     Results from last 7 days   Lab Units 08/08/19  1645   PROCALCITONIN ng/ml 0 59*     Results from last 7 days   Lab Units 08/08/19  1902 08/08/19  1645   LACTIC ACID mmol/L 1 2 5 8*     Results from last 7 days   Lab Units 08/08/19  1802 08/08/19  1754   CLARITY UA Cloudy Slightly Cloudy   COLOR UA  Elvi Yellow   SPEC GRAV UA  1 025 >=1 030   PH UA  5 5 6 0   GLUCOSE UA mg/dl Negative Negative   KETONES UA mg/dl 40 (2+)* 15 (1+)*   BLOOD UA  Moderate* Moderate*   PROTEIN UA mg/dl 100 (2+)* 30 (1+)*   NITRITE UA  Negative Negative   BILIRUBIN UA  Negative Negative   UROBILINOGEN UA E U /dl 0 2 0 2   LEUKOCYTES UA  Large* Large*   WBC UA /hpf  --  Innumerable*   RBC UA /hpf  --  Field obscured, unable to enumerate*   BACTERIA UA /hpf  --  Moderate*   EPITHELIAL CELLS WET PREP /hpf  --  Occasional     Results from last 7 days   Lab Units 08/08/19  1645   TOTAL COUNTED  100     Vital Signs:   08/09/19 0754  98 1 °F (36 7 °C)  78  18  129/61  88  97 %  None (Room air)       Medications:   Scheduled Meds:   Current Facility-Administered Medications:  cefTRIAXone 1,000 mg Intravenous Once 1200   enoxaparin 40 mg Subcutaneous Daily    ondansetron 4 mg Intravenous Q8H PRN    sodium chloride 75 mL/hr Intravenous Continuous Last Rate: 75 mL/hr (08/09/19 1057)       Discharge Plan: to be determined  Network Utilization Review Department  Phone: [phone]; Fax [phone]  [email or web address]  ATTENTION: Please call with any questions or concerns to [phone]  and carefully listen to the prompts so that you are directed to the right person  Send all requests for admission clinical reviews, approved or denied determinations and any other requests to fax [phone]   All voicemails are confidential

## 2019-08-09 NOTE — UTILIZATION REVIEW
Initial Clinical Review    Admission: Date/Time/Statement: 8/8/19 @ 1806     Orders Placed This Encounter   Procedures    Inpatient Admission (expected length of stay for this patient Order details is greater than two midnights)     Standing Status:   Standing     Number of Occurrences:   1     Order Specific Question:   Admitting Physician     Answer:   Shwetha Jordan [[id number]]     Order Specific Question:   Level of Care     Answer:   Med Surg [[id number]]     Order Specific Question:   Estimated length of stay     Answer:   More than 2 Midnights     Order Specific Question:   Certification     Answer:   I certify that inpatient services are medically necessary for this patient for a duration of greater than two midnights  See H&P and MD Progress Notes for additional information about the patient's course of treatment  ED Arrival Information     Expected Arrival Acuity Means of Arrival Escorted By Service Admission Type    - 8/8/2019 16:21 Urgent Wheelchair Family Member General Medicine Urgent    Arrival Complaint    nausea, chills flank pain        Chief Complaint   Patient presents with    Flank Pain     Pt  with left flank pain, fever, chills, and tremors since approx  3 am  Denies urinary complaints  Assessment/Plan: This is a 59year old female from home to ED admitted as inpatient due to Sepsis due to pyelonephritis vs UTI  Presented with fever and left flank pain starting @ 3 am on 8/8/2019  Febrile to 103  on exam tachycardic  UA innumerable WBC, moderate bacteria and blood, +1 protein and ketones  Lactic acid elevated to 5 8  Wbc 5 78 with 10% bands  procalcitonin to 0 59  Blood and urine cultures done  IVF and IV antibiotics in progress  Urology consulted  On 8/9 CT abdomen showing:   There is a 4 mm proximal left ureteral calculus   There is mild hydronephrosis as well as multiple peripelvic cysts  Leona Cola is delayed enhancement of the left kidney with perinephric stranding and mild enhancement of the urothelium of the proximal ureter and collecting system   These findings are consistent with pyelitis  Seen by urology and for OR today: cystoscopy with left retrograde pyelography and left ureteral stent insertion      ED Triage Vitals [08/08/19 1633]   Temperature Pulse Respirations Blood Pressure SpO2   (!) 103 °F (39 4 °C) (!) 127 22 (!) 181/74 96 %      Temp Source Heart Rate Source Patient Position - Orthostatic VS BP Location FiO2 (%)   Oral Monitor Sitting Right arm --      Pain Score       8        Wt Readings from Last 1 Encounters:   08/08/19 62 8 kg (138 lb 7 2 oz)     Additional Vital Signs:   08/09/19 0754  98 1 °F (36 7 °C)  78  18  129/61  88  97 %  None (Room air)  Lying   08/08/19 2300  98 6 °F (37 °C)  79  18  121/77  110  99 %  None (Room air)  Lying   08/08/19 1853  99 6 °F (37 6 °C)  92  18  121/57  81  94 %  None (Room air)  Lying   08/08/19 1814  99 9 °F (37 7 °C)                     Pertinent Labs/Diagnostic Test Results:   8/9/2019 CT abdomen -  Mild left-sided hydronephrosis secondary to a 4 mm proximal left ureteral calculus with urothelial enhancement in the ureter and collecting system consistent with pyelitis   No definite CT evidence of pyelonephritis  2   Interstitial lung disease  3   Diverticulosis coli    4   Moderate hiatal hernia    8/8/2019  CxR- Peripheral interstitial lung disease   Possibly fibrosis  Results from last 7 days   Lab Units 08/08/19  1645   WBC Thousand/uL 5 78   HEMOGLOBIN g/dL 11 1*   HEMATOCRIT % 33 6*   PLATELETS Thousands/uL 160   BANDS PCT % 10*     Results from last 7 days   Lab Units 08/09/19  0533 08/08/19  1645   SODIUM mmol/L 142 139   POTASSIUM mmol/L 4 0 3 6   CHLORIDE mmol/L 109* 103   CO2 mmol/L 23 19*   ANION GAP mmol/L 10 17*   BUN mg/dL 7 12   CREATININE mg/dL 0 80 1 27   EGFR ml/min/1 73sq m 78 45   CALCIUM mg/dL 7 9* 8 9     Results from last 7 days   Lab Units 08/09/19  0533 08/08/19  1645   AST U/L 25 26   ALT U/L 28 29   ALK PHOS U/L 121* 145*   TOTAL PROTEIN g/dL 6 7 8 7*   ALBUMIN g/dL 2 4* 3 0*   TOTAL BILIRUBIN mg/dL 0 20 0 60         Results from last 7 days   Lab Units 08/09/19  0533 08/08/19  1645   GLUCOSE RANDOM mg/dL 91 174*     Results from last 7 days   Lab Units 08/08/19  1645   PROTIME seconds 13 2   INR  1 06   PTT seconds 26     Results from last 7 days   Lab Units 08/08/19  1645   PROCALCITONIN ng/ml 0 59*     Results from last 7 days   Lab Units 08/08/19  1902 08/08/19  1645   LACTIC ACID mmol/L 1 2 5 8*     Results from last 7 days   Lab Units 08/08/19  1802 08/08/19  1754   CLARITY UA  Cloudy Slightly Cloudy   COLOR UA  Elvi Yellow   SPEC GRAV UA  1 025 >=1 030   PH UA  5 5 6 0   GLUCOSE UA mg/dl Negative Negative   KETONES UA mg/dl 40 (2+)* 15 (1+)*   BLOOD UA  Moderate* Moderate*   PROTEIN UA mg/dl 100 (2+)* 30 (1+)*   NITRITE UA  Negative Negative   BILIRUBIN UA  Negative Negative   UROBILINOGEN UA E U /dl 0 2 0 2   LEUKOCYTES UA  Large* Large*   WBC UA /hpf  --  Innumerable*   RBC UA /hpf  --  Field obscured, unable to enumerate*   BACTERIA UA /hpf  --  Moderate*   EPITHELIAL CELLS WET PREP /hpf  --  Occasional     Results from last 7 days   Lab Units 08/08/19  1645   TOTAL COUNTED  100       ED Treatment: blood and urine cultures   Medication Administration from 08/08/2019 1621 to 08/08/2019 1843       Date/Time Order Dose Route Action Comments     08/08/2019 1652 sodium chloride 0 9 % bolus 1,000 mL 1,000 mL Intravenous New Bag      08/08/2019 1656 acetaminophen (TYLENOL) tablet 975 mg 975 mg Oral Given For fever and pain     08/08/2019 1817 ceftriaxone (ROCEPHIN) 1 g/50 mL in dextrose IVPB 1,000 mg Intravenous New Bag         Past Medical History:   Diagnosis Date    Asthma      Present on Admission:  **None**      Admitting Diagnosis: Nausea [R11 0]  UTI (urinary tract infection) [N39 0]  Sepsis ([address] ) [A41 9]  Age/Sex: 59 y o  female  Admission Orders: 8/8/2019  1806 INPATIENT     Current Facility-Administered Medications:  enoxaparin 40 mg Subcutaneous Daily    ondansetron 4 mg Intravenous Q8H PRN    sodium chloride 75 mL/hr Intravenous Continuous Last Rate: 75 mL/hr (08/08/19 2039)   No prn medication used  NPO      Network Utilization Review Department  Phone: [phone]; Fax [phone]  [email or web address]  ATTENTION: Please call with any questions or concerns to [phone]  and carefully listen to the prompts so that you are directed to the right person  Send all requests for admission clinical reviews, approved or denied determinations and any other requests to fax [phone]   All voicemails are confidential

## 2019-08-09 NOTE — ANESTHESIA POSTPROCEDURE EVALUATION
Post-Op Assessment Note    CV Status:  Stable  Pain Score: 0    Pain management: adequate     Mental Status:  Alert and awake   Hydration Status:  Euvolemic   PONV Controlled:  Controlled   Airway Patency:  Patent   Post Op Vitals Reviewed: Yes      Staff: CRNA           /55 (08/09/19 1717)    Temp 98 5 °F (36 9 °C) (08/09/19 1717)    Pulse 78 (08/09/19 1717)   Resp 20 (08/09/19 1717)    SpO2 97 % (08/09/19 1717)

## 2019-08-09 NOTE — CONSULTS
CONSULT    Patient Name: Tammy Sierra  Patient MRN: [id number]  Date of Service: 8/9/2019   Date / Time Note Created: 8/9/2019 11:09 AM   Referring Provider: Diane Nicolas MD    Provider Creating Note: SIDRA Wadsworth    PCP: Aubree Gilliland  Attending Provider:  Diane Nicolas MD    Reason for Consult: Flank Pain    HPI:  Tammy Sierra is a very pleasant 44-year-old [address] female presenting with acute onset of left flank and abdominal pain since Wednesday  Patient made multiple attempts to self-medicated with only temporary relief  Patient reported to emergency room after she developed fever and chills  Patient denies any accompanying dysuria or gross hematuria  CT of the abdomen and pelvis were positive for left obstructing ureteral calculus approximately 4 mm with resultant hydronephrosis  T-max 103° with tachycardia in the 120s  Blood pressure stable  Patient was given IV fluids for elevated lactic acid and resuscitation  Ceftriaxone initiated  Creatinine and WBC count were within normal limits  Urinalysis was grossly positive for infection  Urologic consultation requested for possible surgical management  Active Problems:    Patient Active Problem List   Diagnosis    Sepsis due to Pyelonephritis vs UTI    Pulmonary fibrosis (HCC)    History of DVT with PE    Osteoarthritis    Fibromyalgia    UTI (urinary tract infection)    Left ureteral calculus            Impressions  Left Ureteral Calculus   Left Hydronephrosis  Renal Colic--secondary to previous  SiRs/sepsis--of  etiology      Recommendations  1  Initiate aggressive IVFs   2  Flomax  3  Analgesia/Narcotics   4  Anti-emetics   5  ATBs empirically while awaiting culture   6  Strain urine   7  NPO  for OR if no spontaneous expulsion achieved  Explained risk, benefits and potential complications of ureteroscopic stone extraction   Patient has verbalized understanding of need for ureteral stent only due to active infection; requiring staged ureteroscopy electively once recovered and infection free as OP  Formal consent by surgeon  Past Medical History:   Diagnosis Date    Asthma        No past surgical history on file  No family history on file      Social History     Socioeconomic History    Marital status: Single     Spouse name: Not on file    Number of children: Not on file    Years of education: Not on file    Highest education level: Not on file   Occupational History    Not on file   Social Needs    Financial resource strain: Not on file    Food insecurity:     Worry: Not on file     Inability: Not on file    Transportation needs:     Medical: Not on file     Non-medical: Not on file   Tobacco Use    Smoking status: Never Smoker    Smokeless tobacco: Never Used   Substance and Sexual Activity    Alcohol use: Not Currently     Frequency: Never    Drug use: Never    Sexual activity: Not on file   Lifestyle    Physical activity:     Days per week: Not on file     Minutes per session: Not on file    Stress: Not on file   Relationships    Social connections:     Talks on phone: Not on file     Gets together: Not on file     Attends Church service: Not on file     Active member of club or organization: Not on file     Attends meetings of clubs or organizations: Not on file     Relationship status: Not on file    Intimate partner violence:     Fear of current or ex partner: Not on file     Emotionally abused: Not on file     Physically abused: Not on file     Forced sexual activity: Not on file   Other Topics Concern    Not on file   Social History Narrative    Not on file       No Known Allergies    Review of Systems  10 point review of systems negative except as noted in HPI  Constitutional:   positive for  - chills, fatigue and fever  Cardiovascular:   no chest pain or dyspnea on exertion  Gastrointestinal:   positive for - abdominal pain and nausea/vomiting  Genito-Urinary:   no dysuria, trouble voiding, or hematuria  Neurological:   no TIA or stroke symptoms     Chart Review   Allergies, current medications, history, problem list    Vital Signs  /61 (BP Location: Left arm)   Pulse 78   Temp 98 1 °F (36 7 °C) (Oral)   Resp 18   Ht 5' 3" (1 6 m)   Wt 62 8 kg (138 lb 7 2 oz)   SpO2 97%   BMI 24 53 kg/m²     Physical Exam  General appearance: alert and oriented, in no acute distress, appears stated age, cooperative and no distress  Head: Normocephalic, without obvious abnormality, atraumatic  Neck: no adenopathy, no carotid bruit, no JVD, supple, symmetrical, trachea midline and thyroid not enlarged, symmetric, no tenderness/mass/nodules  Lungs: clear to auscultation bilaterally  Heart: regular rate and rhythm, S1, S2 normal, no murmur, click, rub or gallop  Abdomen: abnormal findings:  moderate tenderness in the lower abdomen  Extremities: extremities normal, warm and well-perfused; no cyanosis, clubbing, or edema  Pulses: 2+ and symmetric  Neurologic: Grossly normal  No urinary drains     Laboratory Studies  Lab Results   Component Value Date    K 4 0 08/09/2019     (H) 08/09/2019    CO2 23 08/09/2019    CREATININE 0 80 08/09/2019    BUN 7 08/09/2019     Lab Results   Component Value Date    WBC 5 78 08/08/2019    RBC 3 38 (L) 08/08/2019    HGB 11 1 (L) 08/08/2019    HCT 33 6 (L) 08/08/2019    MCV 99 (H) 08/08/2019    MCH 32 8 08/08/2019    RDW 12 6 08/08/2019     08/08/2019         Imaging and Other Studies  )  Xr Chest 2 Views    Result Date: 8/8/2019  Narrative: CHEST INDICATION:   fever  COMPARISON:  None EXAM PERFORMED/VIEWS:  XR CHEST PA & LATERAL FINDINGS: Cardiomediastinal silhouette appears unremarkable  Both lungs demonstrate a predominantly peripheral interstitial pattern of opacities typically seen in the setting of pulmonary fibrosis although this would require correlation with patient's symptoms and history    Other types of chronic interstitial lung  disease could be considered as well  Unlikely to be acute pneumonia  Distribution is not typical for CHF/edema  There is no evidence of mass or pleural fluid or pneumothorax  Osseous structures appear within normal limits for patient age  Impression: Peripheral interstitial lung disease  Possibly fibrosis  Correlate with history and symptoms  Workstation performed: [id number]     Ct Abdomen Pelvis W Contrast    Result Date: 8/9/2019  Narrative: CT ABDOMEN AND PELVIS WITH IV CONTRAST INDICATION:   Left flank pain, fever, chills  COMPARISON:  None  TECHNIQUE:  CT examination of the abdomen and pelvis was performed  Axial, sagittal, and coronal 2D reformatted images were created from the source data and submitted for interpretation  Radiation dose length product (DLP) for this visit:  472 mGy-cm   This examination, like all CT scans performed in the [facility], was performed utilizing techniques to minimize radiation dose exposure, including the use of iterative reconstruction and automated exposure control  IV Contrast:  100 mL of iodixanol (VISIPAQUE) Enteric Contrast:  Enteric contrast was administered  FINDINGS: ABDOMEN LOWER CHEST:  There is reticular interstitial thickening at the lung bases  There is a moderate hiatal hernia  LIVER/BILIARY TREE:  Unremarkable  GALLBLADDER:  No calcified gallstones  No pericholecystic inflammatory change  SPLEEN:  Unremarkable  PANCREAS:  Unremarkable  ADRENAL GLANDS:  Unremarkable  KIDNEYS/URETERS:  There is a 4 mm proximal left ureteral calculus  There is mild hydronephrosis as well as multiple peripelvic cysts  There is delayed enhancement of the left kidney with perinephric stranding and mild enhancement of the urothelium of the proximal ureter and collecting system  These findings are consistent with pyelitis  No definite CT evidence of pyelonephritis no delayed images are more sensitive for cortical edema   STOMACH AND BOWEL:  There is colonic diverticulosis without evidence of acute diverticulitis  APPENDIX:  No findings to suggest appendicitis  ABDOMINOPELVIC CAVITY:  No ascites or free intraperitoneal air  No lymphadenopathy  VESSELS:  Unremarkable for patient's age  PELVIS Limited evaluation due to extensive beam hardening artifact from bilateral hip arthroplasties appear REPRODUCTIVE ORGANS:  Unremarkable for patient's age  URINARY BLADDER:  Unremarkable though portions are obscured by artifact  ABDOMINAL WALL/INGUINAL REGIONS:  Unremarkable  OSSEOUS STRUCTURES:  No acute fracture or destructive osseous lesion  Impression: 1  Mild left-sided hydronephrosis secondary to a 4 mm proximal left ureteral calculus with urothelial enhancement in the ureter and collecting system consistent with pyelitis  No definite CT evidence of pyelonephritis  2   Interstitial lung disease  3   Diverticulosis coli  4   Moderate hiatal hernia  I personally discussed this study with Nacho Cleveland on 8/9/2019 at 7:41 AM  Workstation performed: [id number]       Medications     Current Facility-Administered Medications:  enoxaparin 40 mg Subcutaneous Daily Ronny Clarke MD    ondansetron 4 mg Intravenous Q8H PRN SIDRA Jones    sodium chloride 75 mL/hr Intravenous Continuous Phyllistine MD Osito Last Rate: 75 mL/hr (08/09/19 1056)         Total time spent with patient 25 minutes, >50% spent counseling and/or coordination of care           SIDRA Rushing

## 2019-08-09 NOTE — ASSESSMENT & PLAN NOTE
Previous history of bilateral hip replacement in 2014 and 2018-recommendation to continue outpatient management with rheumatology (Dr Irvin Vargas)  · Continue alendronate and vitamin-D    (Alendronate, Vit D)

## 2019-08-09 NOTE — ASSESSMENT & PLAN NOTE
Previous history of left lower extremity DVT and PE 2013  No complaint of shortness of breath  · Continue Lovenox

## 2019-08-09 NOTE — DISCHARGE INSTRUCTIONS
Expect to have burning urgency and frequency and blood in the urine with the stent  The stent can also cause left flank pain especially when voiding  Call for fever greater than 101 5°, or severe pain not relieved by pain medications  You may resume her usual activities except no driving while taking narcotic pain medications  My office will call you with an appointment to be set up for history and physical to schedule urine next procedure which will be cystoscopy, left ureteroscopy and laser lithotripsy of the stone

## 2019-08-09 NOTE — ASSESSMENT & PLAN NOTE
Initial patient presentation left flank pain, temperature 103°, heart rate 127, respirations 22  Left-sided Costovertebral tenderness noted  UA demonstrated large leukocytes and moderate blood  Lactic acid 5 8-repeat 1 2  CT abdomen/pelvis demonstrated mild left-sided hydronephrosis secondary to 4 mm proximal left ureteral calculus, consistent with pyelitis  · Continue sepsis protocol-normal saline 75 mL/hr and antibiotics  · Continue monitoring vitals  · Urology consult appreciated-analgesia, antiemetics and NPO for ureteroscopic stone extraction (left ureteral calculus)

## 2019-08-09 NOTE — ASSESSMENT & PLAN NOTE
Outpatient pulmonologist Dr Saba-recommendation to continue outpatient management    · Continue Esbriet (267mg) 2 tabs tid

## 2019-08-09 NOTE — ASSESSMENT & PLAN NOTE
· Urology consult appreciated-see below for management  · NPO for ureteral stone extraction in the OR in the a m

## 2019-08-09 NOTE — OP NOTE
Mla Willis    [id number]    Date:  8/9/2019    Preoperative Diagnosis:Pre-Op Diagnosis Codes:     * UTI (urinary tract infection) [N39 0]     * Left ureteral calculus [N20 1]    Postoperative Diagnosis:  Febrile left ureteral stone    Procedure: Cystoscopy, left Ureteral Stent Placement    Surgeon: Mounika Marino MD    First Assistant: None                                                       Resident:None    Anesthesia: General    EBL: Minimal    Specimens:None    Findings:    Complications:None      Course of Procedure: Mal Willis  has been scheduled for cystoscopy and decompressive stenting of the left ureter  The indications for the procedure are febrile 4mm proximal left ureteral stone  The risks of bleeding, infection, damage to the urinary tract and adjacent organs were discussed with the patient and informed consent was given  She understands this is a staged procedure  The patient was brought to the operating room and identified  After general anesthesia was induced, the patient was prepared and draped in the dorsolithotomy position in the usual fashion, with standard care taken to protect pressure points  A time out was performed  Rigid cystoscopy was carried out with a 22 fr cysto sheath with 30 and 70 degree lens  The urethra was normal without stricture  The bladder was smooth, nontrabeculated, and there were no stones, tumors, or other lesions except for cystitis cystica  The 0 035" guide wire was fed into the left ureteral orifice and fed under direct and fluoroscopic guidance into the renal pelvis  The stone could not be seen fluoroscopically  After the wire was placed, a 6 [place] JJ ureteral stent was placed over the wire with no string attached  Coils were established in the renal pelvis and bladder as the wire was withdrawn, confirmed fluoroscopically  The bladder was drained, the patient extubated and transferred to the PACU in good condition

## 2019-08-09 NOTE — PROGRESS NOTES
Urology consultation called this morning  Patient admitted through the emergency room on August 8, 2019 with a fever of 103  A CT scan was performed on the evening of August 8, 2019 revealing a left 4 mm proximal obstructing calculus  The patient was admitted with an elevated lactate which cleared with hydration last evening  Fortunately she is now afebrile  White blood cell count on admission 5 78  Most recent vital signs stable  Maintain patient NPO today for cystoscopy with left retrograde pyelography and left ureteral stent insertion in the OR today  A ureteral stent only will be placed today and she will ultimately require interval ureteroscopy in the future  If the patient were to become septic in the interim, consideration can be given to insertion of left nephrostomy tube  For now will plan for left retrograde with left stent insertion today  Full urologic consultation to follow

## 2019-08-09 NOTE — PROGRESS NOTES
Progress Note - Katie Foss 1955, 59 y o  female MRN: [id number]    Unit/Bed#: -01 Encounter: [id number]    Primary Care Provider: Edith Candelario MD   Date and time admitted to hospital: 8/8/2019  4:25 PM        Left ureteral calculus  Assessment & Plan  · Urology consult appreciated-see below for management  · NPO for ureteral stone extraction in the OR in the a m     UTI (urinary tract infection)  Assessment & Plan  · Continue IV antibiotics  Fibromyalgia  Assessment & Plan  · Complaints of lower extremity pain/discomfort-ruled out PE with history pertinent for previous DVT/PE  Osteoarthritis  Assessment & Plan  Previous history of bilateral hip replacement in 2014 and 2018-recommendation to continue outpatient management with rheumatology (Dr Karen Tello)  · Continue alendronate and vitamin-D  (Alendronate, Vit D)        History of DVT with PE  Assessment & Plan  Previous history of left lower extremity DVT and PE 2013  No complaint of shortness of breath  · Continue Lovenox  Pulmonary fibrosis [facility])  Assessment & Plan  Outpatient pulmonologist Dr Saba-recommendation to continue outpatient management  · Continue Esbriet (267mg) 2 tabs tid    * Sepsis due to Pyelonephritis vs UTI  Assessment & Plan  Initial patient presentation left flank pain, temperature 103°, heart rate 127, respirations 22  Left-sided Costovertebral tenderness noted  UA demonstrated large leukocytes and moderate blood  Lactic acid 5 8-repeat 1 2  CT abdomen/pelvis demonstrated mild left-sided hydronephrosis secondary to 4 mm proximal left ureteral calculus, consistent with pyelitis  · Continue sepsis protocol-normal saline 75 mL/hr and antibiotics  · Continue monitoring vitals  · Urology consult appreciated-analgesia, antiemetics and NPO for ureteroscopic stone extraction (left ureteral calculus)       VTE Pharmacologic Prophylaxis:   Pharmacologic: Enoxaparin (Lovenox)  Mechanical VTE Prophylaxis in Place: Yes    Patient Centered Rounds: I have performed bedside rounds with nursing staff today  Discussions with Specialists or Other Care Team Provider:  Case management and urology  Education and Discussions with Family / Patient:  Patient  Time Spent for Care: 30 minutes  More than 50% of total time spent on counseling and coordination of care as described above  Current Length of Stay: 1 day(s)    Current Patient Status: Inpatient   Certification Statement: The patient will continue to require additional inpatient hospital stay due to Ureteral stone extraction in the OR  Discharge Plan:  Possible patient discharge in 24-48 hours  Code Status: Level 1 - Full Code      Subjective:   No complaints stated by the patient time of exam   She denied fever, chills, rigors, shortness of breath, nausea/vomiting/diarrhea and flank pain  She stated she is tolerating her diet well  Presently, patient NPO for OR in the a   Objective:     Vitals:   Temp (24hrs), Av 8 °F (37 7 °C), Min:98 1 °F (36 7 °C), Max:103 °F (39 4 °C)    Temp:  [98 1 °F (36 7 °C)-103 °F (39 4 °C)] 98 1 °F (36 7 °C)  HR:  [] 78  Resp:  [18-22] 18  BP: (121-181)/(57-83) 129/61  SpO2:  [94 %-99 %] 97 %  Body mass index is 24 53 kg/m²  Input and Output Summary (last 24 hours): Intake/Output Summary (Last 24 hours) at 2019 1434  Last data filed at 2019 1300  Gross per 24 hour   Intake 180 ml   Output    Net 180 ml       Physical Exam:     Physical Exam   Constitutional: She is oriented to person, place, and time  Vital signs are normal  She appears well-developed and well-nourished  She is cooperative  She does not appear ill  No distress  HENT:   Head: Normocephalic and atraumatic  Eyes: Pupils are equal, round, and reactive to light  EOM are normal    Neck: Normal range of motion  Neck supple  Cardiovascular: Normal rate, regular rhythm, normal heart sounds, intact distal pulses and normal pulses  Exam reveals no friction rub  No murmur heard  Pulmonary/Chest: Effort normal and breath sounds normal  No stridor  No respiratory distress  She has no wheezes  Abdominal: Soft  Normal appearance and bowel sounds are normal  She exhibits no distension  There is no tenderness  There is no rigidity and no rebound  Musculoskeletal: Normal range of motion  She exhibits no edema  No costovertebral tenderness or flank pain upon palpation  Neurological: She is alert and oriented to person, place, and time  Skin: Skin is warm and dry  Capillary refill takes less than 2 seconds  She is not diaphoretic  Psychiatric: She has a normal mood and affect  Her speech is normal and behavior is normal  Judgment and thought content normal    Vitals reviewed  Additional Data:     Labs:    Results from last 7 days   Lab Units 08/08/19  1645   WBC Thousand/uL 5 78   HEMOGLOBIN g/dL 11 1*   HEMATOCRIT % 33 6*   PLATELETS Thousands/uL 160   BANDS PCT % 10*   LYMPHO PCT % 25   MONO PCT % 1*   EOS PCT % 0     Results from last 7 days   Lab Units 08/09/19  0533   SODIUM mmol/L 142   POTASSIUM mmol/L 4 0   CHLORIDE mmol/L 109*   CO2 mmol/L 23   BUN mg/dL 7   CREATININE mg/dL 0 80   ANION GAP mmol/L 10   CALCIUM mg/dL 7 9*   ALBUMIN g/dL 2 4*   TOTAL BILIRUBIN mg/dL 0 20   ALK PHOS U/L 121*   ALT U/L 28   AST U/L 25   GLUCOSE RANDOM mg/dL 91     Results from last 7 days   Lab Units 08/08/19  1645   INR  1 06             Results from last 7 days   Lab Units 08/08/19  1902 08/08/19  1645   LACTIC ACID mmol/L 1 2 5 8*   PROCALCITONIN ng/ml  --  0 59*           * I Have Reviewed All Lab Data Listed Above  * Additional Pertinent Lab Tests Reviewed: All Labs For Current Hospital Admission Reviewed    Imaging:    Imaging Reports Reviewed Today Include:  CT abdomen/pelvis  Imaging Personally Reviewed by Myself Includes:  None      Recent Cultures (last 7 days):           Last 24 Hours Medication List:     Current Facility-Administered Medications:  enoxaparin 40 mg Subcutaneous Daily Delilah Gracia MD    ondansetron 4 mg Intravenous Q8H PRN SIDRA Jones    sodium chloride 75 mL/hr Intravenous Continuous Phyllistine MD Osito Last Rate: 75 mL/hr (08/09/19 1057)        Today, Patient Was Seen By: Lonell Evert Holter, DO    ** Please Note: Dictation voice to text software may have been used in the creation of this document   **

## 2019-08-09 NOTE — ASSESSMENT & PLAN NOTE
· Complaints of lower extremity pain/discomfort-ruled out PE with history pertinent for previous DVT/PE

## 2019-08-10 VITALS
OXYGEN SATURATION: 96 % | BODY MASS INDEX: 24.53 KG/M2 | DIASTOLIC BLOOD PRESSURE: 64 MMHG | WEIGHT: 138.45 LBS | HEART RATE: 73 BPM | TEMPERATURE: 99 F | RESPIRATION RATE: 18 BRPM | SYSTOLIC BLOOD PRESSURE: 137 MMHG | HEIGHT: 63 IN

## 2019-08-10 PROBLEM — A41.9 SEPSIS (HCC): Status: RESOLVED | Noted: 2019-08-08 | Resolved: 2019-08-10

## 2019-08-10 LAB
BACTERIA UR CULT: ABNORMAL
BACTERIA UR CULT: ABNORMAL

## 2019-08-10 PROCEDURE — 99239 HOSP IP/OBS DSCHRG MGMT >30: CPT | Performed by: INTERNAL MEDICINE

## 2019-08-10 RX ORDER — CEPHALEXIN 500 MG/1
500 CAPSULE ORAL EVERY 6 HOURS SCHEDULED
Status: DISCONTINUED | OUTPATIENT
Start: 2019-08-10 | End: 2019-08-10 | Stop reason: HOSPADM

## 2019-08-10 RX ORDER — CEPHALEXIN 500 MG/1
500 CAPSULE ORAL EVERY 6 HOURS SCHEDULED
Qty: 15 CAPSULE | Refills: 0 | Status: SHIPPED | OUTPATIENT
Start: 2019-08-10 | End: 2019-08-14

## 2019-08-10 RX ADMIN — SODIUM CHLORIDE 75 ML/HR: 0.9 INJECTION, SOLUTION INTRAVENOUS at 02:13

## 2019-08-10 RX ADMIN — ENOXAPARIN SODIUM 40 MG: 40 INJECTION SUBCUTANEOUS at 08:48

## 2019-08-10 RX ADMIN — CEPHALEXIN 500 MG: 500 CAPSULE ORAL at 11:54

## 2019-08-10 NOTE — ANESTHESIA PREPROCEDURE EVALUATION
Review of Systems/Medical History  Patient summary reviewed        Cardiovascular  Negative cardio ROS    Pulmonary  Negative pulmonary ROS        GI/Hepatic  Negative GI/hepatic ROS          Negative  ROS        Endo/Other  Negative endo/other ROS      GYN  Negative gynecology ROS          Hematology  Negative hematology ROS      Musculoskeletal  Negative musculoskeletal ROS        Neurology  Negative neurology ROS      Psychology   Negative psychology ROS              Physical Exam    Airway    Mallampati score: II  TM Distance: >3 FB  Neck ROM: full     Dental   No notable dental hx     Cardiovascular  Comment: Negative ROS,     Pulmonary      Other Findings        Anesthesia Plan  ASA Score- 2     Anesthesia Type- general with ASA Monitors  Additional Monitors:   Airway Plan: LMA  Comment: Patient seen and examined, history reviewed  Patient to be done under general anesthesia with LMA and routine monitors  Risks discussed with the patient, consent obtained        Plan Factors-    Induction- intravenous  Postoperative Plan-     Informed Consent- Anesthetic plan and risks discussed with patient  I personally reviewed this patient with the CRNA  Discussed and agreed on the Anesthesia Plan with the MELISSA Boyd

## 2019-08-10 NOTE — ASSESSMENT & PLAN NOTE
Previous history of bilateral hip replacement in 2014 and 2018-recommendation to continue outpatient management with rheumatology (Dr Moo Bowens)     · Continue alendronate and vitamin-D

## 2019-08-10 NOTE — DISCHARGE SUMMARY
Discharge- Carson Jamal 1955, 59 y o  female MRN: [id number]    Unit/Bed#: -01 Encounter: [id number]    Primary Care Provider: Steve Espinal MD   Date and time admitted to hospital: 8/8/2019  4:25 PM    Left ureteral calculus  Assessment & Plan  · Urology consult appreciated-see below for management  UTI (urinary tract infection)  Assessment & Plan  · Discontinue IV antibiotics  Fibromyalgia  Assessment & Plan  · Complaints of lower extremity pain/discomfort-ruled out PE with history pertinent for previous DVT/PE  Osteoarthritis  Assessment & Plan  Previous history of bilateral hip replacement in 2014 and 2018-recommendation to continue outpatient management with rheumatology (Dr Magana Ast)  · Continue alendronate and vitamin-D  History of DVT with PE  Assessment & Plan  Previous history of left lower extremity DVT and PE 2013  No complaint of shortness of breath  · Continue Lovenox  Pulmonary fibrosis [facility])  Assessment & Plan  Outpatient pulmonologist Dr Saba-recommendation to continue outpatient management  · Continue Esbriet (267mg) 2 tabs tid    * Sepsis due to Pyelonephritis vs UTI  Assessment & Plan  Initial patient presentation left flank pain, temperature 103°, heart rate 127, respirations 22  Left-sided Costovertebral tenderness noted  UA demonstrated large leukocytes and moderate blood  Lactic acid 5 8-repeat 1 2  CT abdomen/pelvis demonstrated mild left-sided hydronephrosis secondary to 4 mm proximal left ureteral calculus, consistent with pyelitis  · Continue sepsis protocol-normal saline 75 mL/hr  · Continue monitoring vitals  · Urology consult appreciated-cystoscopy with left ureteral stent insertion yesterday  Outpatient laser lithotripsy recommended      Discharging Physician / Practitioner: Bill Oneill DO  PCP: Steve Espinal MD  Admission Date:   Admission Orders (From admission, onward)     Ordered        08/08/19 1806  Inpatient Admission (expected length of stay for this patient Order details is greater than two midnights)  Once                   Discharge Date: 08/10/19    Resolved Problems  Date Reviewed: 8/10/2019    None          Consultations During Hospital Stay:  · Urology  Procedures Performed:   · Cystoscopy with Ureteral stent  Significant Findings / Test Results:   · CBC-See below  · CMP-See below  · Lactic acid-See below  · Procalcitonin-See below  · UA with reflex-See below  · CT abdomen/pelvis-See below  Incidental Findings:   · Diverticulosis coli  · Hiatal hernia  Test Results Pending at Discharge (will require follow up):   · Urine culture  · Blood culture  Outpatient Tests Requested:  · None  Complications:  None  Reason for Admission:  Left flank pain/fever/chills  Hospital Course:     Rachael Caldwell is a 59 y o  female patient that originally presented to [address] [place] [address] ED on 8/8/2019 due to fever, chills and worsening left flank pain described as a nonradiating ache  Initial vitals the ED temperature 103°, heart rate 127, respirations 22, blood pressure 181/74, SpO2 96%  1 g of Rocephin and 0 9% normal saline in addition to Tylenol 975 mg administered in the ED  Lactic acid 1 2-repeat 5 8  Procalcitonin 0 59  CBC demonstrated hemoglobin 11 1 and hematocrit 33 6  CMP demonstrated anion gap 17, glucose 174 and alk-phos 145  Urine microscope demonstrated innumerable white blood cells and moderate bacteria  UA with reflex demonstrated large leukocytes and moderate blood  Patient attended [facility]  CT abdomen pelvis demonstrated left-sided hydronephrosis secondary to 4 mm proximal left ureteral calculus with urothelial enhancement in the urinary collecting system; consistent with pyelitis  See above for incidental findings  Urology consulted  IV antibiotics and IV fluids continued    NPO diet for cystoscopy with left retrograde pyelogram and left ureteral stent insertion  XR a abdomen demonstrated satisfactory appearance of left ureter stent  Outpatient Urology referral in addition to laser lithotripsy recommended  P o  Keflex prescribed to complete 7 day antibiotic course  Patient appropriate for discharge per Urology  Patient vitals temperature 99°, heart rate 73, respirations 18, /64 and SpO2 96%  Discussed discharge plan with patient-she is amenable  Please see above list of diagnoses and related plan for additional information  Condition at Discharge: stable     Discharge Day Visit / Exam:     Subjective:  No complaints stated by patient time of exam   She denied fever, chills, pain/discomfort, nausea/vomiting  Patient endorsed appropriate voiding without presence of dysuria or hematuria  She endorsed appropriate appetite  Patient amenable to discharge plan  Vitals: Blood Pressure: 133/61 (08/09/19 2300)  Pulse: 78 (08/09/19 2300)  Temperature: 98 8 °F (37 1 °C) (08/09/19 2300)  Temp Source: Oral (08/09/19 2300)  Respirations: 18 (08/09/19 2300)  Height: 5' 3" (160 cm) (08/08/19 1853)  Weight - Scale: 62 8 kg (138 lb 7 2 oz) (08/08/19 1853)  SpO2: 98 % (08/09/19 2300)  Exam:   Physical Exam   Constitutional: She is oriented to person, place, and time  Vital signs are normal  She appears well-developed and well-nourished  She is cooperative  She does not appear ill  No distress  HENT:   Head: Normocephalic and atraumatic  Eyes: Pupils are equal, round, and reactive to light  EOM are normal    Neck: Normal range of motion  Neck supple  Cardiovascular: Normal rate, regular rhythm, normal heart sounds, intact distal pulses and normal pulses  Exam reveals no friction rub  No murmur heard  Pulmonary/Chest: Effort normal and breath sounds normal  No stridor  No respiratory distress  She has no wheezes  Abdominal: Soft  Normal appearance and bowel sounds are normal  She exhibits no distension   There is no tenderness  There is no rebound  Musculoskeletal: Normal range of motion  She exhibits no edema  Neurological: She is alert and oriented to person, place, and time  Skin: Skin is warm, dry and intact  Capillary refill takes less than 2 seconds  She is not diaphoretic  Psychiatric: She has a normal mood and affect  Her speech is normal and behavior is normal  Judgment and thought content normal    Nursing note and vitals reviewed  Discussion with Family:  Patient's spouse  Discharge instructions/Information to patient and family:   See after visit summary for information provided to patient and family  Provisions for Follow-Up Care:  See after visit summary for information related to follow-up care and any pertinent home health orders  Disposition:     Home    For Discharges to [facility] SNF:   · Not Applicable to this Patient - Not Applicable to this Patient    Planned Readmission:  None  Discharge Statement:  I spent 35 minutes discharging the patient  This time was spent on the day of discharge  I had direct contact with the patient on the day of discharge  Greater than 50% of the total time was spent examining patient, answering all patient questions, arranging and discussing plan of care with patient as well as directly providing post-discharge instructions  Additional time then spent on discharge activities  Discharge Medications:  See after visit summary for reconciled discharge medications provided to patient and family        ** Please Note: This note has been constructed using a voice recognition system **

## 2019-08-10 NOTE — ASSESSMENT & PLAN NOTE
Initial patient presentation left flank pain, temperature 103°, heart rate 127, respirations 22  Left-sided Costovertebral tenderness noted  UA demonstrated large leukocytes and moderate blood  Lactic acid 5 8-repeat 1 2  CT abdomen/pelvis demonstrated mild left-sided hydronephrosis secondary to 4 mm proximal left ureteral calculus, consistent with pyelitis  · Continue sepsis protocol-normal saline 75 mL/hr  · Continue monitoring vitals  · Urology consult appreciated-cystoscopy with left ureteral stent insertion yesterday  Outpatient laser lithotripsy recommended

## 2019-08-12 ENCOUNTER — TELEPHONE (OUTPATIENT)
Dept: UROLOGY | Facility: MEDICAL CENTER | Age: 64
End: 2019-08-12

## 2019-08-12 NOTE — TELEPHONE ENCOUNTER
Scheduled patient for H&P on 8/19 w  Dr Johny Vasques, [facility] with patient to relay scheduled appointment

## 2019-08-12 NOTE — TELEPHONE ENCOUNTER
----- Message from Janell Mcdonald MD sent at 8/9/2019  5:12 PM EDT -----  Please call patient to set up an appointment for history and physical to set up cystoscopy left ureteroscopy laser lithotripsy as an outpatient  She was just stented in the operating room at [facility]

## 2019-08-13 LAB
BACTERIA BLD CULT: NORMAL
BACTERIA BLD CULT: NORMAL

## 2019-08-13 RX ORDER — SACCHAROMYCES BOULARDII 250 MG
250 CAPSULE ORAL DAILY
COMMUNITY
End: 2019-08-19 | Stop reason: ALTCHOICE

## 2019-08-13 RX ORDER — TRAMADOL HYDROCHLORIDE 50 MG/1
50 TABLET ORAL EVERY 6 HOURS PRN
COMMUNITY
End: 2019-08-19 | Stop reason: ALTCHOICE

## 2019-08-13 RX ORDER — LANOLIN ALCOHOL/MO/W.PET/CERES
1 CREAM (GRAM) TOPICAL 3 TIMES DAILY
COMMUNITY
End: 2020-07-17

## 2019-08-13 RX ORDER — OXYCODONE HYDROCHLORIDE 10 MG/1
TABLET ORAL EVERY 4 HOURS
COMMUNITY
End: 2019-08-19 | Stop reason: ALTCHOICE

## 2019-08-13 RX ORDER — ALENDRONATE SODIUM 70 MG/1
70 TABLET ORAL
COMMUNITY

## 2019-08-16 NOTE — UTILIZATION REVIEW
Initial Clinical Review    Admission: Date/Time/Statement: 8/8/19 @ 1806     Orders Placed This Encounter   Procedures    Inpatient Admission (expected length of stay for this patient Order details is greater than two midnights)     Standing Status:   Standing     Number of Occurrences:   1     Order Specific Question:   Admitting Physician     Answer:   Carri Means [[id number]]     Order Specific Question:   Level of Care     Answer:   Med Surg [16]     Order Specific Question:   Estimated length of stay     Answer:   More than 2 Midnights     Order Specific Question:   Certification     Answer:   I certify that inpatient services are medically necessary for this patient for a duration of greater than two midnights  See H&P and MD Progress Notes for additional information about the patient's course of treatment  ED Arrival Information     Expected Arrival Acuity Means of Arrival Escorted By Service Admission Type    - 8/8/2019 16:21 Urgent Wheelchair Family Member General Medicine Urgent    Arrival Complaint    nausea, chills flank pain        Chief Complaint   Patient presents with    Flank Pain     Pt  with left flank pain, fever, chills, and tremors since approx  3 am  Denies urinary complaints  Assessment/Plan: This is a 59year old female from home to ED admitted as inpatient due to Sepsis due to pyelonephritis vs UTI  Presented with fever and left flank pain starting @ 3 am on 8/8/2019  Febrile to 103  on exam tachycardic  UA innumerable WBC, moderate bacteria and blood, +1 protein and ketones  Lactic acid elevated to 5 8  Wbc 5 78 with 10% bands  procalcitonin to 0 59  Blood and urine cultures done  IVF and IV antibiotics in progress  Urology consulted  On 8/9 CT abdomen showing:   There is a 4 mm proximal left ureteral calculus   There is mild hydronephrosis as well as multiple peripelvic cysts  Sully Lind is delayed enhancement of the left kidney with perinephric stranding and mild enhancement of the urothelium of the proximal ureter and collecting system   These findings are consistent with pyelitis  Seen by urology and for OR today: cystoscopy with left retrograde pyelography and left ureteral stent insertion      ED Triage Vitals [08/08/19 1633]   Temperature Pulse Respirations Blood Pressure SpO2   (!) 103 °F (39 4 °C) (!) 127 22 (!) 181/74 96 %      Temp Source Heart Rate Source Patient Position - Orthostatic VS BP Location FiO2 (%)   Oral Monitor Sitting Right arm --      Pain Score       8          Wt Readings from Last 1 Encounters:   08/08/19 62 8 kg (138 lb 7 2 oz)     Additional Vital Signs:   08/09/19 0754  98 1 °F (36 7 °C)  78  18  129/61  88  97 %  None (Room air)  Lying   08/08/19 2300  98 6 °F (37 °C)  79  18  121/77  110  99 %  None (Room air)  Lying   08/08/19 1853  99 6 °F (37 6 °C)  92  18  121/57  81  94 %  None (Room air)  Lying   08/08/19 1814  99 9 °F (37 7 °C)                     Pertinent Labs/Diagnostic Test Results:   8/9/2019 CT abdomen -  Mild left-sided hydronephrosis secondary to a 4 mm proximal left ureteral calculus with urothelial enhancement in the ureter and collecting system consistent with pyelitis   No definite CT evidence of pyelonephritis  2   Interstitial lung disease  3   Diverticulosis coli    4   Moderate hiatal hernia    8/8/2019  CxR- Peripheral interstitial lung disease   Possibly fibrosis                                            ED Treatment: blood and urine cultures   Medication Administration from 08/08/2019 1621 to 08/08/2019 1843       Date/Time Order Dose Route Action Comments     08/08/2019 1652 sodium chloride 0 9 % bolus 1,000 mL 1,000 mL Intravenous New Bag      08/08/2019 1656 acetaminophen (TYLENOL) tablet 975 mg 975 mg Oral Given For fever and pain     08/08/2019 1817 ceftriaxone (ROCEPHIN) 1 g/50 mL in dextrose IVPB 1,000 mg Intravenous New Bag         Past Medical History:   Diagnosis Date    Asthma      Present on Admission:  **None**      Admitting Diagnosis: Nausea [R11 0]  UTI (urinary tract infection) [N39 0]  Sepsis ([address] ) [A41 9]  Age/Sex: 59 y o  female  Admission Orders: 8/8/2019  1806 INPATIENT     Current Facility-Administered Medications:  enoxaparin 40 mg Subcutaneous Daily    ondansetron 4 mg Intravenous Q8H PRN    sodium chloride 75 mL/hr Intravenous Continuous Last Rate: 75 mL/hr (08/08/19 2039)   No prn medication used  NPO      Network Utilization Review Department  Phone: [phone]; Fax [phone]  [email or web address]  ATTENTION: Please call with any questions or concerns to [phone]  and carefully listen to the prompts so that you are directed to the right person  Send all requests for admission clinical reviews, approved or denied determinations and any other requests to fax [phone]  All voicemails are confidential        Mami Olivares RN   Registered Nurse   Utilization Review   Utilization Review   Signed   Date of Service:  8/9/2019 12:04 PM               Signed             Show:Clear all  [x]Manual[x]Template[x]Copied    Added by:  Grace Nuñez RN    []Pieter for details  Continued Stay Review     Date: 8/9/2019                         Current Patient Class: inpatient       Current Level of Care: med surg      HPI:64 y o  female initially admitted on 8/8/2019 inpatient due to sepsis due to pyelonephritis vs UTI  CT of the abdomen and pelvis were positive for left obstructing ureteral calculus approximately 4 mm with resultant hydronephrosis   T-max 103° with tachycardia in the 120s  UA positive for infection  IVF and IV antibiotics in progress       Assessment/Plan: today 8/9 Patient to go to OR for left ureteral calculus with left hydronephrosis  IVF, IV antibiotics in progress  Cultures are pending      Procedure scheduled for 1700:  Cystoscopy retrograde pyelogram with insertion stent ureteral (75820 CPT)     Pertinent Labs/Diagnostic Results: Results from last 7 days   Lab Units 08/08/19  1645   WBC Thousand/uL 5 78   HEMOGLOBIN g/dL 11 1*   HEMATOCRIT % 33 6*   PLATELETS Thousands/uL 160   BANDS PCT % 10*            Results from last 7 days   Lab Units 08/09/19  0533 08/08/19  1645   SODIUM mmol/L 142 139   POTASSIUM mmol/L 4 0 3 6   CHLORIDE mmol/L 109* 103   CO2 mmol/L 23 19*   ANION GAP mmol/L 10 17*   BUN mg/dL 7 12   CREATININE mg/dL 0 80 1 27   EGFR ml/min/1 73sq m 78 45   CALCIUM mg/dL 7 9* 8 9            Results from last 7 days   Lab Units 08/09/19  0533 08/08/19  1645   AST U/L 25 26   ALT U/L 28 29   ALK PHOS U/L 121* 145*   TOTAL PROTEIN g/dL 6 7 8 7*   ALBUMIN g/dL 2 4* 3 0*   TOTAL BILIRUBIN mg/dL 0 20 0 60                Results from last 7 days   Lab Units 08/09/19  0533 08/08/19  1645   GLUCOSE RANDOM mg/dL 91 174*           Results from last 7 days   Lab Units 08/08/19  1645   PROTIME seconds 13 2   INR   1 06   PTT seconds 26           Results from last 7 days   Lab Units 08/08/19  1645   PROCALCITONIN ng/ml 0 59*            Results from last 7 days   Lab Units 08/08/19  1902 08/08/19  1645   LACTIC ACID mmol/L 1 2 5 8*            Results from last 7 days   Lab Units 08/08/19  1802 08/08/19  1754   CLARITY UA   Cloudy Slightly Cloudy   COLOR UA   Elvi Yellow   SPEC GRAV UA   1 025 >=1 030   PH UA   5 5 6 0   GLUCOSE UA mg/dl Negative Negative   KETONES UA mg/dl 40 (2+)* 15 (1+)*   BLOOD UA   Moderate* Moderate*   PROTEIN UA mg/dl 100 (2+)* 30 (1+)*   NITRITE UA   Negative Negative   BILIRUBIN UA   Negative Negative   UROBILINOGEN UA E U /dl 0 2 0 2   LEUKOCYTES UA   Large* Large*   WBC UA /hpf  --  Innumerable*   RBC UA /hpf  --  Field obscured, unable to enumerate*   BACTERIA UA /hpf  --  Moderate*   EPITHELIAL CELLS WET PREP /hpf  --  Occasional           Results from last 7 days   Lab Units 08/08/19  1645   TOTAL COUNTED   100      Vital Signs:   08/09/19 0754   98 1 °F (36 7 °C)   78   18   129/61   88   97 %   None (Room air)         Medications:   Scheduled Meds:   Current Facility-Administered Medications:  cefTRIAXone 1,000 mg Intravenous Once 1200   enoxaparin 40 mg Subcutaneous Daily     ondansetron 4 mg Intravenous Q8H PRN     sodium chloride 75 mL/hr Intravenous Continuous Last Rate: 75 mL/hr (08/09/19 1057)         Discharge Plan: to be determined       Network Utilization Review Department  Phone: [phone]; Fax [phone]  [email or web address]  ATTENTION: Please call with any questions or concerns to [phone]  and carefully listen to the prompts so that you are directed to the right person  Send all requests for admission clinical reviews, approved or denied determinations and any other requests to fax [phone]   All voicemails are confidential

## 2019-08-19 ENCOUNTER — OFFICE VISIT (OUTPATIENT)
Dept: UROLOGY | Facility: MEDICAL CENTER | Age: 64
End: 2019-08-19
Payer: COMMERCIAL

## 2019-08-19 VITALS
HEART RATE: 92 BPM | DIASTOLIC BLOOD PRESSURE: 80 MMHG | WEIGHT: 132 LBS | SYSTOLIC BLOOD PRESSURE: 120 MMHG | HEIGHT: 63 IN | BODY MASS INDEX: 23.39 KG/M2

## 2019-08-19 DIAGNOSIS — N20.1 LEFT URETERAL CALCULUS: Primary | ICD-10-CM

## 2019-08-19 PROCEDURE — 99214 OFFICE O/P EST MOD 30 MIN: CPT | Performed by: UROLOGY

## 2019-08-19 RX ORDER — OMEPRAZOLE 20 MG/1
20 CAPSULE, DELAYED RELEASE ORAL DAILY
COMMUNITY

## 2019-08-19 RX ORDER — CHOLECALCIFEROL (VITAMIN D3) 125 MCG
1 CAPSULE ORAL DAILY
COMMUNITY

## 2019-08-19 RX ORDER — CEFAZOLIN SODIUM 2 G/50ML
2000 SOLUTION INTRAVENOUS ONCE
Status: CANCELLED | OUTPATIENT
Start: 2019-09-04 | End: 2019-08-19

## 2019-08-19 NOTE — PROGRESS NOTES
[address] [facility]  [facility]  [address] [facility], [address] [place] [address]  [phone]  www  [email or web address]  [email or web address]      NAME: Brendan Mayorga  AGE: 59 y o  SEX: female  : 1955   MRN: [id number]    DATE: 2019  TIME: 9:12 AM    Assessment and Plan:    Left ureteral calculus:  Proximal stone, need cystoscopy, left ureteroscopy laser lithotripsy and left ureteral stent exchange as above  Check urine culture  Chief Complaint   No chief complaint on file  History of Present Illness   4 mm proximal left ureteral stone:  Status post stenting by me 2019  She was infected with 2 strains of E coli, both sensitive to Keflex, Bactrim and quinolones as well as nitrofurantoin  She now require cystoscopy, left ureteroscopy laser lithotripsy and left ureteral stent exchange  The risks of bleeding, infection, damage to the urinary tract were explained she gives informed consent  She is tolerating the stent well  The following portions of the patient's history were reviewed and updated as appropriate: allergies, current medications, past family history, past medical history, past social history, past surgical history and problem list   Past Medical History:   Diagnosis Date    Asthma      Past Surgical History:   Procedure Laterality Date    MS CYSTOURETHROSCOPY,URETER CATHETER Left 2019    Procedure: CYSTOSCOPY WITH INSERTION STENT URETERAL;  Surgeon: Criss Ladd MD;  [facility];  Service: Urology     shoulder  Review of Systems   Review of Systems   Constitutional: Negative for fever  Genitourinary: Negative  Active Problem List     Patient Active Problem List   Diagnosis    Pulmonary fibrosis (HCC)    History of DVT with PE    Osteoarthritis    Fibromyalgia    UTI (urinary tract infection)    Left ureteral calculus       Objective   There were no vitals taken for this visit      Physical Exam   Constitutional: She is oriented to person, place, and time  She appears well-developed and well-nourished  HENT:   Head: Normocephalic and atraumatic  Eyes: Conjunctivae and EOM are normal  No scleral icterus  Neck: Normal range of motion  Neck supple  Cardiovascular: Normal rate, regular rhythm and normal heart sounds  Exam reveals no friction rub  No murmur heard  Pulmonary/Chest: Effort normal and breath sounds normal  No stridor  No respiratory distress  Abdominal: Soft  She exhibits no distension  There is no tenderness  There is no guarding  Musculoskeletal: Normal range of motion  Neurological: She is alert and oriented to person, place, and time  Skin: Skin is warm and dry  Psychiatric: She has a normal mood and affect   Her behavior is normal  Judgment and thought content normal            Current Medications     Current Outpatient Medications:     alendronate (FOSAMAX) 70 mg tablet, alendronate 70 mg tablet, Disp: , Rfl:     glucosamine-chondroitin 500-400 MG tablet, Take 1 tablet by mouth Three times a day, Disp: , Rfl:     methylPREDNISolone (MEDROL) 2 MG tablet, Take 2 mg by mouth daily, Disp: , Rfl:     oxyCODONE (ROXICODONE) 10 MG TABS, every 4 (four) hours, Disp: , Rfl:     saccharomyces boulardii (FLORASTOR) 250 mg capsule, Take 250 mg by mouth daily, Disp: , Rfl:     traMADol (ULTRAM) 50 mg tablet, Take 50 mg by mouth every 6 (six) hours as needed, Disp: , Rfl:         Donald Palomino MD

## 2019-08-19 NOTE — H&P
[address] [facility]  [facility]  [address] [facility]  [place], [address] [place] [address]  [phone]  www  [facility]  org      NAME: Manuela Hernandez  AGE: 59 y o  SEX: female  : 1955   MRN: [id number]    DATE: 2019  TIME: 9:12 AM    Assessment and Plan:    Left ureteral calculus:  Proximal stone, need cystoscopy, left ureteroscopy laser lithotripsy and left ureteral stent exchange as above  Check urine culture  Chief Complaint   No chief complaint on file  History of Present Illness   4 mm proximal left ureteral stone:  Status post stenting by me 2019  She was infected with 2 strains of E coli, both sensitive to Keflex, Bactrim and quinolones as well as nitrofurantoin  She now require cystoscopy, left ureteroscopy laser lithotripsy and left ureteral stent exchange  The risks of bleeding, infection, damage to the urinary tract were explained she gives informed consent  She is tolerating the stent well  The following portions of the patient's history were reviewed and updated as appropriate: allergies, current medications, past family history, past medical history, past social history, past surgical history and problem list   Past Medical History:   Diagnosis Date    Asthma      Past Surgical History:   Procedure Laterality Date    [place] CYSTOURETHROSCOPY,URETER CATHETER Left 2019    Procedure: CYSTOSCOPY WITH INSERTION STENT URETERAL;  Surgeon: Ryan Crooks MD;  [facility];  Service: Urology     shoulder  Review of Systems   Review of Systems   Constitutional: Negative for fever  Genitourinary: Negative  Active Problem List     Patient Active Problem List   Diagnosis    Pulmonary fibrosis (HCC)    History of DVT with PE    Osteoarthritis    Fibromyalgia    UTI (urinary tract infection)    Left ureteral calculus       Objective   There were no vitals taken for this visit      Physical Exam   Constitutional: She is oriented to person, place, and time  She appears well-developed and well-nourished  HENT:   Head: Normocephalic and atraumatic  Eyes: Conjunctivae and EOM are normal  No scleral icterus  Neck: Normal range of motion  Neck supple  Cardiovascular: Normal rate, regular rhythm and normal heart sounds  Exam reveals no friction rub  No murmur heard  Pulmonary/Chest: Effort normal and breath sounds normal  No stridor  No respiratory distress  Abdominal: Soft  She exhibits no distension  There is no tenderness  There is no guarding  Musculoskeletal: Normal range of motion  Neurological: She is alert and oriented to person, place, and time  Skin: Skin is warm and dry  Psychiatric: She has a normal mood and affect   Her behavior is normal  Judgment and thought content normal            Current Medications     Current Outpatient Medications:     alendronate (FOSAMAX) 70 mg tablet, alendronate 70 mg tablet, Disp: , Rfl:     glucosamine-chondroitin 500-400 MG tablet, Take 1 tablet by mouth Three times a day, Disp: , Rfl:     methylPREDNISolone (MEDROL) 2 MG tablet, Take 2 mg by mouth daily, Disp: , Rfl:     oxyCODONE (ROXICODONE) 10 MG TABS, every 4 (four) hours, Disp: , Rfl:     saccharomyces boulardii (FLORASTOR) 250 mg capsule, Take 250 mg by mouth daily, Disp: , Rfl:     traMADol (ULTRAM) 50 mg tablet, Take 50 mg by mouth every 6 (six) hours as needed, Disp: , Rfl:         Briana Catalan MD

## 2019-08-19 NOTE — H&P (VIEW-ONLY)
[address] [facility]  [facility]  [address] [facility]  [address] [facility], [address] [place] [address]  [phone]  www  [email or web address]  [email or web address]      NAME: Marciano Wise  AGE: 59 y o  SEX: female  : 1955   MRN: [id number]    DATE: 2019  TIME: 9:12 AM    Assessment and Plan:    Left ureteral calculus:  Proximal stone, need cystoscopy, left ureteroscopy laser lithotripsy and left ureteral stent exchange as above  Check urine culture  Chief Complaint   No chief complaint on file  History of Present Illness   4 mm proximal left ureteral stone:  Status post stenting by me 2019  She was infected with 2 strains of E coli, both sensitive to Keflex, Bactrim and quinolones as well as nitrofurantoin  She now require cystoscopy, left ureteroscopy laser lithotripsy and left ureteral stent exchange  The risks of bleeding, infection, damage to the urinary tract were explained she gives informed consent  She is tolerating the stent well  The following portions of the patient's history were reviewed and updated as appropriate: allergies, current medications, past family history, past medical history, past social history, past surgical history and problem list   Past Medical History:   Diagnosis Date    Asthma      Past Surgical History:   Procedure Laterality Date    [place] CYSTOURETHROSCOPY,URETER CATHETER Left 2019    Procedure: CYSTOSCOPY WITH INSERTION STENT URETERAL;  Surgeon: Kathleen Salazar MD;  [facility];  Service: Urology     shoulder  Review of Systems   Review of Systems   Constitutional: Negative for fever  Genitourinary: Negative  Active Problem List     Patient Active Problem List   Diagnosis    Pulmonary fibrosis (HCC)    History of DVT with PE    Osteoarthritis    Fibromyalgia    UTI (urinary tract infection)    Left ureteral calculus       Objective   There were no vitals taken for this visit      Physical Exam   Constitutional: She is oriented to person, place, and time  She appears well-developed and well-nourished  HENT:   Head: Normocephalic and atraumatic  Eyes: Conjunctivae and EOM are normal  No scleral icterus  Neck: Normal range of motion  Neck supple  Cardiovascular: Normal rate, regular rhythm and normal heart sounds  Exam reveals no friction rub  No murmur heard  Pulmonary/Chest: Effort normal and breath sounds normal  No stridor  No respiratory distress  Abdominal: Soft  She exhibits no distension  There is no tenderness  There is no guarding  Musculoskeletal: Normal range of motion  Neurological: She is alert and oriented to person, place, and time  Skin: Skin is warm and dry  Psychiatric: She has a normal mood and affect   Her behavior is normal  Judgment and thought content normal            Current Medications     Current Outpatient Medications:     alendronate (FOSAMAX) 70 mg tablet, alendronate 70 mg tablet, Disp: , Rfl:     glucosamine-chondroitin 500-400 MG tablet, Take 1 tablet by mouth Three times a day, Disp: , Rfl:     methylPREDNISolone (MEDROL) 2 MG tablet, Take 2 mg by mouth daily, Disp: , Rfl:     oxyCODONE (ROXICODONE) 10 MG TABS, every 4 (four) hours, Disp: , Rfl:     saccharomyces boulardii (FLORASTOR) 250 mg capsule, Take 250 mg by mouth daily, Disp: , Rfl:     traMADol (ULTRAM) 50 mg tablet, Take 50 mg by mouth every 6 (six) hours as needed, Disp: , Rfl:         Trinidad May MD

## 2019-08-20 ENCOUNTER — TELEPHONE (OUTPATIENT)
Dept: UROLOGY | Facility: MEDICAL CENTER | Age: 64
End: 2019-08-20

## 2019-08-22 ENCOUNTER — TELEPHONE (OUTPATIENT)
Dept: UROLOGY | Facility: MEDICAL CENTER | Age: 64
End: 2019-08-22

## 2019-08-22 DIAGNOSIS — N20.1 LEFT URETERAL CALCULUS: Primary | ICD-10-CM

## 2019-08-22 RX ORDER — CIPROFLOXACIN 500 MG/1
500 TABLET, FILM COATED ORAL EVERY 12 HOURS SCHEDULED
Qty: 14 TABLET | Refills: 0 | Status: SHIPPED | OUTPATIENT
Start: 2019-08-22 | End: 2019-08-26 | Stop reason: SDUPTHER

## 2019-08-22 NOTE — TELEPHONE ENCOUNTER
Left message; Patient's culture shows a UTI  She's listed for "no preferred pharmacy" in her chart  She can either come by the office and  a written prescription or let us know where she'd like it to be sent  Either way, she is to call back and let us know

## 2019-08-22 NOTE — TELEPHONE ENCOUNTER
LMOM that Dr Liu Garcia has a Rx for antibiotic, Cipro for her  We do not have a pharmacy on file  Please stop by the office to  the written Rx or call us to let us know what pharmacy she uses

## 2019-08-22 NOTE — UTILIZATION REVIEW
Notification of Discharge  This is a Notification of Discharge from our facility [address]  Please be advised that this patient has been discharge from our facility  Below you will find the admission and discharge date and time including the patients disposition  PRESENTATION DATE: 8/8/2019  4:25 PM  OBS ADMISSION DATE:  IP ADMISSION DATE: 8/8/19 1806   DISCHARGE DATE: 8/10/2019  1:04 PM  DISPOSITION: Home/Self Care Home/Self Care   Admission Orders listed below:  Admission Orders (From admission, onward)     Ordered        08/08/19 1806  Inpatient Admission (expected length of stay for this patient Order details is greater than two midnights)  Once                   Please contact the UR Department if additional information is required to close this patient's authorization/case  [address]  Utilization Review Department  Phone: [phone]; Fax [phone]  [email or web address]  ATTENTION: Please call with any questions or concerns to [phone]  and carefully listen to the prompts so that you are directed to the right person  Send all requests for admission clinical reviews, approved or denied determinations and any other requests to fax [phone]   All voicemails are confidential

## 2019-08-22 NOTE — TELEPHONE ENCOUNTER
----- Message from Jalyn Oshea MD sent at 8/22/2019  1:28 PM EDT -----  Please call her and have her come in and  prescription for Cipro to take-she has UTI

## 2019-08-23 NOTE — UTILIZATION REVIEW
Cindy Foster RN   Registered Nurse   Utilization Review   Utilization Review   Signed   Date of Service:  8/9/2019 10:28 AM               Signed        Expand All Collapse All      Show:Clear all  [x]Manual[x]Template[x]Copied    Added by:  Acacia Zamudio RN    []Pieter for details  Initial Clinical Review     Admission: Date/Time/Statement: 8/8/19 @ 1806            Orders Placed This Encounter   Procedures    Inpatient Admission (expected length of stay for this patient Order details is greater than two midnights)       Standing Status:   Standing       Number of Occurrences:   1       Order Specific Question:   Admitting Physician       Answer:   Mike Carlson       Order Specific Question:   Level of Care       Answer:   Med Surg [16]       Order Specific Question:   Estimated length of stay       Answer:   More than 2 Midnights       Order Specific Question:   Certification       Answer:   I certify that inpatient services are medically necessary for this patient for a duration of greater than two midnights  See H&P and MD Progress Notes for additional information about the patient's course of treatment                 ED Arrival Information      Expected Arrival Acuity Means of Arrival Escorted By Service Admission Type     - 8/8/2019 16:21 Urgent Wheelchair Family Member General Medicine Urgent     Arrival Complaint     nausea, chills flank pain               Chief Complaint   Patient presents with    Flank Pain       Pt  with left flank pain, fever, chills, and tremors since approx  3 am  Denies urinary complaints        Assessment/Plan: This is a 59year old female from home to ED admitted as inpatient due to Sepsis due to pyelonephritis vs UTI  Presented with fever and left flank pain starting @ 3 am on 8/8/2019  Febrile to 103  on exam tachycardic  UA innumerable WBC, moderate bacteria and blood, +1 protein and ketones  Lactic acid elevated to 5 8  Wbc 5 78 with 10% bands  procalcitonin to 0 59  Blood and urine cultures done  IVF and IV antibiotics in progress  Urology consulted       On 8/9 CT abdomen showing: There is a 4 mm proximal left ureteral calculus   There is mild hydronephrosis as well as multiple peripelvic cysts  Madie Quitter is delayed enhancement of the left kidney with perinephric stranding and mild enhancement of the urothelium of the proximal ureter and collecting system   These findings are consistent with pyelitis  Seen by urology and for OR today: cystoscopy with left retrograde pyelography and left ureteral stent insertion                ED Triage Vitals [08/08/19 1633]   Temperature Pulse Respirations Blood Pressure SpO2   (!) 103 °F (39 4 °C) (!) 127 22 (!) 181/74 96 %       Temp Source Heart Rate Source Patient Position - Orthostatic VS BP Location FiO2 (%)   Oral Monitor Sitting Right arm --       Pain Score           8                Wt Readings from Last 1 Encounters:   08/08/19 62 8 kg (138 lb 7 2 oz)      Additional Vital Signs:   08/09/19 0754   98 1 °F (36 7 °C)   78   18   129/61   88   97 %   None (Room air)   Lying   08/08/19 2300   98 6 °F (37 °C)   79   18   121/77   110   99 %   None (Room air)   Lying   08/08/19 1853   99 6 °F (37 6 °C)   92   18   121/57   81   94 %   None (Room air)   Lying   08/08/19 1814   99 9 °F (37 7 °C)                                Pertinent Labs/Diagnostic Test Results:   8/9/2019 CT abdomen -  Mild left-sided hydronephrosis secondary to a 4 mm proximal left ureteral calculus with urothelial enhancement in the ureter and collecting system consistent with pyelitis   No definite CT evidence of pyelonephritis  2   Interstitial lung disease  3   Diverticulosis coli    4   Moderate hiatal hernia     8/8/2019  CxR- Peripheral interstitial lung disease   Possibly fibrosis       Results from last 7 days   Lab Units 08/08/19  1645   WBC Thousand/uL 5 78   HEMOGLOBIN g/dL 11 1*   HEMATOCRIT % 33 6*   PLATELETS Thousands/uL 160   BANDS PCT % 10*            Results from last 7 days   Lab Units 08/09/19  0533 08/08/19  1645   SODIUM mmol/L 142 139   POTASSIUM mmol/L 4 0 3 6   CHLORIDE mmol/L 109* 103   CO2 mmol/L 23 19*   ANION GAP mmol/L 10 17*   BUN mg/dL 7 12   CREATININE mg/dL 0 80 1 27   EGFR ml/min/1 73sq m 78 45   CALCIUM mg/dL 7 9* 8 9            Results from last 7 days   Lab Units 08/09/19  0533 08/08/19  1645   AST U/L 25 26   ALT U/L 28 29   ALK PHOS U/L 121* 145*   TOTAL PROTEIN g/dL 6 7 8 7*   ALBUMIN g/dL 2 4* 3 0*   TOTAL BILIRUBIN mg/dL 0 20 0 60                Results from last 7 days   Lab Units 08/09/19  0533 08/08/19  1645   GLUCOSE RANDOM mg/dL 91 174*           Results from last 7 days   Lab Units 08/08/19  1645   PROTIME seconds 13 2   INR   1 06   PTT seconds 26           Results from last 7 days   Lab Units 08/08/19  1645   PROCALCITONIN ng/ml 0 59*            Results from last 7 days   Lab Units 08/08/19  1902 08/08/19  1645   LACTIC ACID mmol/L 1 2 5 8*            Results from last 7 days   Lab Units 08/08/19  1802 08/08/19  1754   CLARITY UA   Cloudy Slightly Cloudy   COLOR UA   Elvi Yellow   SPEC GRAV UA   1 025 >=1 030   PH UA   5 5 6 0   GLUCOSE UA mg/dl Negative Negative   KETONES UA mg/dl 40 (2+)* 15 (1+)*   BLOOD UA   Moderate* Moderate*   PROTEIN UA mg/dl 100 (2+)* 30 (1+)*   NITRITE UA   Negative Negative   BILIRUBIN UA   Negative Negative   UROBILINOGEN UA E U /dl 0 2 0 2   LEUKOCYTES UA   Large* Large*   WBC UA /hpf  --  Innumerable*   RBC UA /hpf  --  Field obscured, unable to enumerate*   BACTERIA UA /hpf  --  Moderate*   EPITHELIAL CELLS WET PREP /hpf  --  Occasional           Results from last 7 days   Lab Units 08/08/19  1645   TOTAL COUNTED   100         ED Treatment: blood and urine cultures              Medication Administration from 08/08/2019 1621 to 08/08/2019 1843        Date/Time Order Dose Route Action Comments       08/08/2019 1652 sodium chloride 0 9 % bolus 1,000 mL 1,000 mL Intravenous New Bag         08/08/2019 1656 acetaminophen (TYLENOL) tablet 975 mg 975 mg Oral Given For fever and pain       08/08/2019 1817 ceftriaxone (ROCEPHIN) 1 g/50 mL in dextrose IVPB 1,000 mg Intravenous New Bag            Medical History        Past Medical History:   Diagnosis Date    Asthma           Present on Admission:  **None**        Admitting Diagnosis: Nausea [R11 0]  UTI (urinary tract infection) [N39 0]  Sepsis ([address] ) [A41 9]  Age/Sex: 59 y o  female  Admission Orders: 8/8/2019  1806 INPATIENT      Current Facility-Administered Medications:  enoxaparin 40 mg Subcutaneous Daily     ondansetron 4 mg Intravenous Q8H PRN     sodium chloride 75 mL/hr Intravenous Continuous Last Rate: 75 mL/hr (08/08/19 2039)   No prn medication used       NPO        Network Utilization Review Department  Phone: [phone]; Fax [phone]  [email or web address]  ATTENTION: Please call with any questions or concerns to [phone]  and carefully listen to the prompts so that you are directed to the right person  Send all requests for admission clinical reviews, approved or denied determinations and any other requests to fax [phone]  All voicemails are confidential                  Marina Beckwith RN   Registered Nurse   Utilization Review   Utilization Review   Signed   Date of Service:  8/9/2019 12:04 PM               Signed             Show:Clear all  [x]Manual[x]Template[x]Copied    Added by:  Gokul Baxter RN    []Pieter for details  Continued Stay Review     Date: 8/9/2019                         Current Patient Class: inpatient       Current Level of Care: med surg      HPI:64 y o  female initially admitted on 8/8/2019 inpatient due to sepsis due to pyelonephritis vs UTI  CT of the abdomen and pelvis were positive for left obstructing ureteral calculus approximately 4 mm with resultant hydronephrosis   T-max 103° with tachycardia in the 120s  UA positive for infection  IVF and IV antibiotics in progress       Assessment/Plan: today 8/9 Patient to go to OR for left ureteral calculus with left hydronephrosis  IVF, IV antibiotics in progress  Cultures are pending      Procedure scheduled for 1700:  Cystoscopy retrograde pyelogram with insertion stent ureteral ([id number])     Pertinent Labs/Diagnostic Results:        Results from last 7 days   Lab Units 08/08/19  1645   WBC Thousand/uL 5 78   HEMOGLOBIN g/dL 11 1*   HEMATOCRIT % 33 6*   PLATELETS Thousands/uL 160   BANDS PCT % 10*            Results from last 7 days   Lab Units 08/09/19  0533 08/08/19  1645   SODIUM mmol/L 142 139   POTASSIUM mmol/L 4 0 3 6   CHLORIDE mmol/L 109* 103   CO2 mmol/L 23 19*   ANION GAP mmol/L 10 17*   BUN mg/dL 7 12   CREATININE mg/dL 0 80 1 27   EGFR ml/min/1 73sq m 78 45   CALCIUM mg/dL 7 9* 8 9            Results from last 7 days   Lab Units 08/09/19  0533 08/08/19  1645   AST U/L 25 26   ALT U/L 28 29   ALK PHOS U/L 121* 145*   TOTAL PROTEIN g/dL 6 7 8 7*   ALBUMIN g/dL 2 4* 3 0*   TOTAL BILIRUBIN mg/dL 0 20 0 60                Results from last 7 days   Lab Units 08/09/19  0533 08/08/19  1645   GLUCOSE RANDOM mg/dL 91 174*           Results from last 7 days   Lab Units 08/08/19  1645   PROTIME seconds 13 2   INR   1 06   PTT seconds 26           Results from last 7 days   Lab Units 08/08/19  1645   PROCALCITONIN ng/ml 0 59*            Results from last 7 days   Lab Units 08/08/19  1902 08/08/19  1645   LACTIC ACID mmol/L 1 2 5 8*            Results from last 7 days   Lab Units 08/08/19  1802 08/08/19  1754   CLARITY UA   Cloudy Slightly Cloudy   COLOR UA   Elvi Yellow   SPEC GRAV UA   1 025 >=1 030   PH UA   5 5 6 0   GLUCOSE UA mg/dl Negative Negative   KETONES UA mg/dl 40 (2+)* 15 (1+)*   BLOOD UA   Moderate* Moderate*   PROTEIN UA mg/dl 100 (2+)* 30 (1+)*   NITRITE UA   Negative Negative   BILIRUBIN UA   Negative Negative   UROBILINOGEN UA E U /dl 0 2 0 2   LEUKOCYTES UA   Large* Large*   WBC UA /hpf  --  Innumerable*   RBC UA /hpf  --  Field obscured, unable to enumerate*   BACTERIA UA /hpf  --  Moderate*   EPITHELIAL CELLS WET PREP /hpf  --  Occasional           Results from last 7 days   Lab Units 08/08/19  1645   TOTAL COUNTED   100      Vital Signs:   08/09/19 0754   98 1 °F (36 7 °C)   78   18   129/61   88   97 %   None (Room air)         Medications:   Scheduled Meds:   Current Facility-Administered Medications:  cefTRIAXone 1,000 mg Intravenous Once 1200   enoxaparin 40 mg Subcutaneous Daily     ondansetron 4 mg Intravenous Q8H PRN     sodium chloride 75 mL/hr Intravenous Continuous Last Rate: 75 mL/hr (08/09/19 1057)         Discharge Plan: to be determined       Network Utilization Review Department  Phone: [phone]; Fax [phone]  [email or web address]  ATTENTION: Please call with any questions or concerns to [phone]  and carefully listen to the prompts so that you are directed to the right person  Send all requests for admission clinical reviews, approved or denied determinations and any other requests to fax [phone]   All voicemails are confidential

## 2019-08-23 NOTE — TELEPHONE ENCOUNTER
Patient returned called and advised that she would like the medication sent to the [facility] on [address]  Patient would like this listed as her pharmacy  Please advise

## 2019-08-26 DIAGNOSIS — N20.1 LEFT URETERAL CALCULUS: ICD-10-CM

## 2019-08-26 RX ORDER — CIPROFLOXACIN 500 MG/1
500 TABLET, FILM COATED ORAL EVERY 12 HOURS SCHEDULED
Qty: 14 TABLET | Refills: 0 | Status: SHIPPED | OUTPATIENT
Start: 2019-08-26 | End: 2019-08-28

## 2019-08-28 ENCOUNTER — TELEPHONE (OUTPATIENT)
Dept: UROLOGY | Facility: MEDICAL CENTER | Age: 64
End: 2019-08-28

## 2019-08-28 DIAGNOSIS — N39.0 URINARY TRACT INFECTION WITHOUT HEMATURIA, SITE UNSPECIFIED: Primary | ICD-10-CM

## 2019-08-28 RX ORDER — PIRFENIDONE 267 MG/1
2 TABLET, FILM COATED ORAL 3 TIMES DAILY
COMMUNITY

## 2019-08-28 RX ORDER — NITROFURANTOIN 25; 75 MG/1; MG/1
100 CAPSULE ORAL 2 TIMES DAILY
Qty: 10 CAPSULE | Refills: 0 | Status: SHIPPED | OUTPATIENT
Start: 2019-08-28 | End: 2019-09-02

## 2019-08-28 NOTE — PRE-PROCEDURE INSTRUCTIONS
Pre-Surgery Instructions:   Medication Instructions    alendronate (FOSAMAX) 70 mg tablet Instructed patient per Anesthesia Guidelines   ciprofloxacin (CIPRO) 500 mg tablet Instructed patient per Anesthesia Guidelines   glucosamine-chondroitin 500-400 MG tablet Instructed patient per Anesthesia Guidelines   Lactobacillus (PROBIOTIC ACIDOPHILUS) CAPS Instructed patient per Anesthesia Guidelines   methotrexate 2 5 mg tablet Instructed patient per Anesthesia Guidelines   omeprazole (PriLOSEC) 20 mg delayed release capsule Instructed patient per Anesthesia Guidelines   Pirfenidone (ESBRIET) 267 MG TABS Instructed patient per Anesthesia Guidelines  Instructed to take Omeprazole with sip of water the morning of surgery per anesthesia guidelines    No aspirin, NSAIDs, vitamins, or supplements 1 week before surgery

## 2019-08-28 NOTE — TELEPHONE ENCOUNTER
Patient of Dr Liu Garcia [facility]  Patient advised that the cipro she is taking is making her nauseous  She tried it with food and with out food and still makes her nauseous  Patient would like to know if there is something else she could take  Please advise

## 2019-08-28 NOTE — TELEPHONE ENCOUNTER
Can you please review her chart and switch antibiotics? Patient is scheduled for 09/04/19 to have cysto/ureteroscopy with holmium laser done

## 2019-09-03 ENCOUNTER — ANESTHESIA EVENT (OUTPATIENT)
Dept: PERIOP | Facility: HOSPITAL | Age: 64
End: 2019-09-03
Payer: COMMERCIAL

## 2019-09-04 ENCOUNTER — TELEPHONE (OUTPATIENT)
Dept: UROLOGY | Facility: MEDICAL CENTER | Age: 64
End: 2019-09-04

## 2019-09-04 ENCOUNTER — ANESTHESIA (OUTPATIENT)
Dept: PERIOP | Facility: HOSPITAL | Age: 64
End: 2019-09-04
Payer: COMMERCIAL

## 2019-09-04 ENCOUNTER — APPOINTMENT (OUTPATIENT)
Dept: RADIOLOGY | Facility: HOSPITAL | Age: 64
End: 2019-09-04
Payer: COMMERCIAL

## 2019-09-04 ENCOUNTER — HOSPITAL ENCOUNTER (OUTPATIENT)
Facility: HOSPITAL | Age: 64
Setting detail: OUTPATIENT SURGERY
Discharge: HOME | End: 2019-09-04
Attending: UROLOGY | Admitting: UROLOGY
Payer: COMMERCIAL

## 2019-09-04 VITALS
TEMPERATURE: 99.8 F | BODY MASS INDEX: 23.39 KG/M2 | WEIGHT: 132 LBS | HEIGHT: 63 IN | SYSTOLIC BLOOD PRESSURE: 141 MMHG | HEART RATE: 85 BPM | DIASTOLIC BLOOD PRESSURE: 63 MMHG | RESPIRATION RATE: 18 BRPM | OXYGEN SATURATION: 95 %

## 2019-09-04 DIAGNOSIS — N20.1 LEFT URETERAL CALCULUS: ICD-10-CM

## 2019-09-04 PROCEDURE — C1758 CATHETER, URETERAL: HCPCS | Performed by: UROLOGY

## 2019-09-04 PROCEDURE — 74018 RADEX ABDOMEN 1 VIEW: CPT

## 2019-09-04 PROCEDURE — C2617 STENT, NON-COR, TEM W/O DEL: HCPCS | Performed by: UROLOGY

## 2019-09-04 PROCEDURE — C1769 GUIDE WIRE: HCPCS | Performed by: UROLOGY

## 2019-09-04 PROCEDURE — C1894 INTRO/SHEATH, NON-LASER: HCPCS | Performed by: UROLOGY

## 2019-09-04 PROCEDURE — 52356 CYSTO/URETERO W/LITHOTRIPSY: CPT | Performed by: UROLOGY

## 2019-09-04 DEVICE — INLAY OPTIMA URETERAL STENT W/O GUIDEWIRE
Type: IMPLANTABLE DEVICE | Site: URETER | Status: FUNCTIONAL
Brand: BARD® INLAY OPTIMA® URETERAL STENT

## 2019-09-04 RX ORDER — PROPOFOL 10 MG/ML
INJECTION, EMULSION INTRAVENOUS AS NEEDED
Status: DISCONTINUED | OUTPATIENT
Start: 2019-09-04 | End: 2019-09-04 | Stop reason: SURG

## 2019-09-04 RX ORDER — METOCLOPRAMIDE HYDROCHLORIDE 5 MG/ML
INJECTION INTRAMUSCULAR; INTRAVENOUS AS NEEDED
Status: DISCONTINUED | OUTPATIENT
Start: 2019-09-04 | End: 2019-09-04 | Stop reason: SURG

## 2019-09-04 RX ORDER — PHENAZOPYRIDINE HYDROCHLORIDE 200 MG/1
200 TABLET, FILM COATED ORAL ONCE
Status: COMPLETED | OUTPATIENT
Start: 2019-09-04 | End: 2019-09-04

## 2019-09-04 RX ORDER — SUCCINYLCHOLINE/SOD CL,ISO/PF 100 MG/5ML
SYRINGE (ML) INTRAVENOUS AS NEEDED
Status: DISCONTINUED | OUTPATIENT
Start: 2019-09-04 | End: 2019-09-04 | Stop reason: SURG

## 2019-09-04 RX ORDER — LIDOCAINE HYDROCHLORIDE 10 MG/ML
INJECTION, SOLUTION INFILTRATION; PERINEURAL AS NEEDED
Status: DISCONTINUED | OUTPATIENT
Start: 2019-09-04 | End: 2019-09-04 | Stop reason: SURG

## 2019-09-04 RX ORDER — FENTANYL CITRATE 50 UG/ML
INJECTION, SOLUTION INTRAMUSCULAR; INTRAVENOUS AS NEEDED
Status: DISCONTINUED | OUTPATIENT
Start: 2019-09-04 | End: 2019-09-04 | Stop reason: SURG

## 2019-09-04 RX ORDER — SODIUM CHLORIDE 9 MG/ML
125 INJECTION, SOLUTION INTRAVENOUS CONTINUOUS
Status: DISCONTINUED | OUTPATIENT
Start: 2019-09-04 | End: 2019-09-04 | Stop reason: HOSPADM

## 2019-09-04 RX ORDER — SULFAMETHOXAZOLE AND TRIMETHOPRIM 800; 160 MG/1; MG/1
1 TABLET ORAL 2 TIMES DAILY
Qty: 6 TABLET | Refills: 0 | Status: SHIPPED | OUTPATIENT
Start: 2019-09-04 | End: 2019-09-07

## 2019-09-04 RX ORDER — DEXAMETHASONE SODIUM PHOSPHATE 10 MG/ML
INJECTION, SOLUTION INTRAMUSCULAR; INTRAVENOUS AS NEEDED
Status: DISCONTINUED | OUTPATIENT
Start: 2019-09-04 | End: 2019-09-04 | Stop reason: SURG

## 2019-09-04 RX ORDER — HYDROCODONE BITARTRATE AND ACETAMINOPHEN 5; 325 MG/1; MG/1
1 TABLET ORAL EVERY 6 HOURS PRN
Status: DISCONTINUED | OUTPATIENT
Start: 2019-09-04 | End: 2019-09-04 | Stop reason: HOSPADM

## 2019-09-04 RX ORDER — ONDANSETRON 2 MG/ML
INJECTION INTRAMUSCULAR; INTRAVENOUS AS NEEDED
Status: DISCONTINUED | OUTPATIENT
Start: 2019-09-04 | End: 2019-09-04 | Stop reason: SURG

## 2019-09-04 RX ORDER — OXYBUTYNIN CHLORIDE 5 MG/1
5 TABLET ORAL 3 TIMES DAILY PRN
Qty: 20 TABLET | Refills: 0 | Status: SHIPPED | OUTPATIENT
Start: 2019-09-04 | End: 2020-07-17

## 2019-09-04 RX ORDER — CEFAZOLIN SODIUM 2 G/50ML
2000 SOLUTION INTRAVENOUS ONCE
Status: COMPLETED | OUTPATIENT
Start: 2019-09-04 | End: 2019-09-04

## 2019-09-04 RX ORDER — ONDANSETRON 4 MG/1
4 TABLET, FILM COATED ORAL EVERY 8 HOURS PRN
Qty: 20 TABLET | Refills: 0 | Status: SHIPPED | OUTPATIENT
Start: 2019-09-04 | End: 2020-07-17

## 2019-09-04 RX ORDER — OXYBUTYNIN CHLORIDE 5 MG/1
10 TABLET, EXTENDED RELEASE ORAL ONCE
Status: COMPLETED | OUTPATIENT
Start: 2019-09-04 | End: 2019-09-04

## 2019-09-04 RX ORDER — FENTANYL CITRATE/PF 50 MCG/ML
50 SYRINGE (ML) INJECTION
Status: DISCONTINUED | OUTPATIENT
Start: 2019-09-04 | End: 2019-09-04 | Stop reason: HOSPADM

## 2019-09-04 RX ORDER — ONDANSETRON 2 MG/ML
4 INJECTION INTRAMUSCULAR; INTRAVENOUS ONCE AS NEEDED
Status: DISCONTINUED | OUTPATIENT
Start: 2019-09-04 | End: 2019-09-04 | Stop reason: HOSPADM

## 2019-09-04 RX ORDER — MAGNESIUM HYDROXIDE 1200 MG/15ML
LIQUID ORAL AS NEEDED
Status: DISCONTINUED | OUTPATIENT
Start: 2019-09-04 | End: 2019-09-04 | Stop reason: HOSPADM

## 2019-09-04 RX ORDER — HYDROCODONE BITARTRATE AND ACETAMINOPHEN 5; 325 MG/1; MG/1
1-2 TABLET ORAL EVERY 6 HOURS PRN
Qty: 20 TABLET | Refills: 0 | Status: SHIPPED | OUTPATIENT
Start: 2019-09-04 | End: 2019-09-24

## 2019-09-04 RX ORDER — MIDAZOLAM HYDROCHLORIDE 1 MG/ML
INJECTION INTRAMUSCULAR; INTRAVENOUS AS NEEDED
Status: DISCONTINUED | OUTPATIENT
Start: 2019-09-04 | End: 2019-09-04 | Stop reason: SURG

## 2019-09-04 RX ADMIN — MIDAZOLAM 2 MG: 1 INJECTION INTRAMUSCULAR; INTRAVENOUS at 07:22

## 2019-09-04 RX ADMIN — LIDOCAINE HYDROCHLORIDE 100 MG: 10 INJECTION, SOLUTION INFILTRATION; PERINEURAL at 07:39

## 2019-09-04 RX ADMIN — FENTANYL CITRATE 50 MCG: 50 INJECTION, SOLUTION INTRAMUSCULAR; INTRAVENOUS at 07:39

## 2019-09-04 RX ADMIN — SODIUM CHLORIDE 125 ML/HR: 0.9 INJECTION, SOLUTION INTRAVENOUS at 06:32

## 2019-09-04 RX ADMIN — PHENAZOPYRIDINE HYDROCHLORIDE 200 MG: 200 TABLET ORAL at 11:02

## 2019-09-04 RX ADMIN — OXYBUTYNIN CHLORIDE 10 MG: 5 TABLET, EXTENDED RELEASE ORAL at 10:59

## 2019-09-04 RX ADMIN — Medication 100 MG: at 07:39

## 2019-09-04 RX ADMIN — CEFAZOLIN SODIUM 2000 MG: 2 SOLUTION INTRAVENOUS at 07:24

## 2019-09-04 RX ADMIN — DEXAMETHASONE SODIUM PHOSPHATE 10 MG: 10 INJECTION, SOLUTION INTRAMUSCULAR; INTRAVENOUS at 07:46

## 2019-09-04 RX ADMIN — SODIUM CHLORIDE 125 ML/HR: 0.9 INJECTION, SOLUTION INTRAVENOUS at 10:35

## 2019-09-04 RX ADMIN — FENTANYL CITRATE 25 MCG: 50 INJECTION, SOLUTION INTRAMUSCULAR; INTRAVENOUS at 08:11

## 2019-09-04 RX ADMIN — METOCLOPRAMIDE 10 MG: 5 INJECTION, SOLUTION INTRAMUSCULAR; INTRAVENOUS at 08:15

## 2019-09-04 RX ADMIN — ONDANSETRON 4 MG: 2 INJECTION INTRAMUSCULAR; INTRAVENOUS at 07:46

## 2019-09-04 RX ADMIN — PROPOFOL 200 MG: 10 INJECTION, EMULSION INTRAVENOUS at 07:39

## 2019-09-04 NOTE — TELEPHONE ENCOUNTER
Pharmacy called questioning Cipro with Zofran  She is told that the Cipro was discontinued on 08/28/19

## 2019-09-04 NOTE — INTERVAL H&P NOTE
H&P reviewed  After examining the patient I find no changes in the patients condition since the H&P had been written      Vitals:    09/04/19 0614   BP: 149/74   Pulse: 86   Resp: 18   Temp: 98 5 °F (36 9 °C)   SpO2: 98%

## 2019-09-04 NOTE — TELEPHONE ENCOUNTER
Forward to clinical  ----- Message from Ryan Crooks MD sent at 9/4/2019  8:12 AM EDT -----  Please call patient with an appointment for the nurses to pull out the string in 1 week, then see me in 6 months with a renal ultrasound

## 2019-09-04 NOTE — DISCHARGE INSTRUCTIONS
Expect to see blood in the urine, and to experience urgency/frequency/burning with urination and dribbling  This is normal after urological procedures  Is also normal to experience some nausea after these procedures  Go back your regular diet carefully  It is normal to feel pain in the kidney when urinating and when the bladder is filling due to urine refluxing up to the kidney because of the open stent  The stent is necessary to keep the ureter open to allow clots and swelling to resolve and to allow the kidney to drain properly after instrumentation  Some stones may pass around the stent, but most stones pass after the stent is removed  The presence of the stent makes the ureter wider while it is in and after has been removed, allowing passage of larger fragments  Call for fever greater that 101 5, inability to urinate, prolonged nausea and vomiting, or severe pain not relieved by pain medications  Win Fierro Keep stent string taped- if it becomes dislodged or gets pulled out early, that is OK- simply remove it completely by pulling on string until it is completely out  No driving/operating machinery for 24 hours, and while taking narcotics  Take over the counter remedy of choice to avoid constipation  Drink plenty of fluids  Our office will call you with an appointment to schedule stent removal with the nurses  I will See you in 6 months with a renal ultrasound done just before

## 2019-09-04 NOTE — OP NOTE
OPERATIVE REPORT  PATIENT NAME: Mal Willis    :  1955  MRN: [id number]  Pt Location: [facility] OR ROOM 03    SURGERY DATE: 2019    Surgeon(s) and Role:     * Celia Ryan MD - Primary    Preop Diagnosis:  Left ureteral calculus [N20 1]    Post-Op Diagnosis Codes:     * Left ureteral calculus [N20 1]    Procedure:  Cystoscopy, left ureteroscopy, laser lithotripsy, left ureteral stent exchange    Specimen(s):  None    Estimated Blood Loss:   Minimal    Drains:  Ureteral Drain/Stent Left ureter 6 Fr  (Active)   Number of days: 0       Anesthesia Type:   General    Operative Indications:  Left ureteral calculus [N20 1]      Operative Findings:  Proximal left ureteral calculus, looks like calcium phosphate, broken up into small fragments  Friable mucosa of the renal pelvis and proximal ureter  Mild cystitis cystica of the bladder  Complications:   None    Procedure and Technique:  51-year-old woman status post decompressive stenting by me recently for febrile 4 mm proximal left ureteral stone is here for definitive therapy in the form of cystoscopy, left ureteroscopy laser lithotripsy and left ureteral stent exchange  The procedure, along with risks of bleeding infection and inability to access the stone with need for further procedures has been explained she gives informed consent  The patient was brought to the operating room and identified properly  General endotracheal anesthesia was induced the patient was prepped and draped in the dorsal lithotomy position usual fashion  A time-out was performed, and cystoscopy was carried out with a 22 Indonesian cystoscopy sheath and 30 degree lens  The urethra was normal without stricture  There was mild cystitis cystica , but nothing like it was before  This stent was seen and grasped with a grasping forceps and brought to the urethral meatus  A wire was fed up through this and then the stent was removed intact over the wire    Two wire access was established with the dual-lumen catheter and then I placed the ureteral access sheath up into the proximal ureter  The flexible ureteral scope was then used to localize the stone in the proximal ureter and I pushed in the renal pelvis and using the 272 holmium laser fiber broken up small passable fragments  There was friability of the mucosa of the proximal ureter and the renal pelvis  No abnormalities  No other stones  The scope was then withdrawn and a 6 [place] by 26 cm Bard inlay stent was placed over the wire and coils were established in the renal pelvis and the bladder  The bladder was drained with the cystoscopy sheath in the string was externalized and taped to the lower abdomen     I was present for the entire procedure and A qualified resident physician was not available    Patient Disposition:  PACU  and extubated and stable    SIGNATURE: Ruy Caro MD  DATE: September 4, 2019  TIME: 8:15 AM

## 2019-09-04 NOTE — ANESTHESIA PREPROCEDURE EVALUATION
Review of Systems/Medical History  Patient summary reviewed  Chart reviewed      Cardiovascular  Negative cardio ROS DVT (LLE)  PE,  Pulmonary  Negative pulmonary ROS Asthma ,   Comment: Pulmonary fibrosis     GI/Hepatic    GERD well controlled,        Kidney stones,        Endo/Other  Negative endo/other ROS      GYN  Negative gynecology ROS          Hematology  Negative hematology ROS      Musculoskeletal    Arthritis     Neurology    Fibromyalgia   Psychology   Negative psychology ROS              Physical Exam    Airway    Mallampati score: II  TM Distance: >3 FB  Neck ROM: full     Dental   No notable dental hx     Cardiovascular  Comment: Negative ROS, Rhythm: regular, Rate: normal,     Pulmonary  Decreased breath sounds,     Other Findings        Anesthesia Plan  ASA Score- 3     Anesthesia Type- general with ASA Monitors  Additional Monitors:   Airway Plan: LMA  Comment: Patient seen and examined, history reviewed  Patient to be done under general anesthesia with LMA and routine monitors  Risks discussed with the patient, consent obtained        Plan Factors- Patient instructed to abstain from smoking on day of procedure  Patient did not smoke on day of surgery  Induction- intravenous  Postoperative Plan-     Informed Consent- Anesthetic plan and risks discussed with patient  I personally reviewed this patient with the CRNA  Discussed and agreed on the Anesthesia Plan with the MELISSA Boyd

## 2019-09-04 NOTE — TELEPHONE ENCOUNTER
Called and left message for patient to give office a call back to schedule stent with string removal

## 2019-09-11 ENCOUNTER — PROCEDURE VISIT (OUTPATIENT)
Dept: UROLOGY | Facility: MEDICAL CENTER | Age: 64
End: 2019-09-11
Payer: COMMERCIAL

## 2019-09-11 VITALS — HEART RATE: 81 BPM | TEMPERATURE: 97.3 F | HEIGHT: 63 IN | WEIGHT: 132 LBS | BODY MASS INDEX: 23.39 KG/M2

## 2019-09-11 DIAGNOSIS — N20.0 KIDNEY STONES: Primary | ICD-10-CM

## 2019-09-11 PROCEDURE — 99211 OFF/OP EST MAY X REQ PHY/QHP: CPT

## 2019-09-11 NOTE — PROGRESS NOTES
9/11/2019  José Luis Amato is a 59 y o  female  [id number]        Diagnosis  Chief Complaint     Nephrolithiasis          Patient is s/p ureteroscopy with stone extraction on 09- with Dr Brittany Daniel 24 hour urine collection and RTO 6 months with KUB one week prior to that      Procedure Stent with String Removal    Vitals:    09/11/19 1150   Pulse: 81   Temp: (!) 97 3 °F (36 3 °C)   TempSrc: Tympanic   Weight: 59 9 kg (132 lb)   Height: 5' 3" (1 6 m)       Stent with string removed intact without difficulty  Reviewed post stent removal symptoms including flank pain, dysuria, and hematuria  Instructed patient to increase oral fluid intake  Encouraged the use of NSAIDS and other prescribed pain medication as needed for discomfort  Patient instructed to call the office or report to the ER for uncontrolled pain, fever, chills, nausea or vomiting  Pt given instructions verbally and in writing on preventing further kidney stones  Orders in computer for the 24 hour urine collection and KUB prior to f/u in office        [facility]

## 2019-09-23 ENCOUNTER — APPOINTMENT (OUTPATIENT)
Dept: LAB | Facility: CLINIC | Age: 64
End: 2019-09-23
Payer: COMMERCIAL

## 2019-09-23 PROCEDURE — 82340 ASSAY OF CALCIUM IN URINE: CPT

## 2019-09-23 PROCEDURE — 82436 ASSAY OF URINE CHLORIDE: CPT

## 2019-09-23 PROCEDURE — 83945 ASSAY OF OXALATE: CPT

## 2019-09-23 PROCEDURE — 84300 ASSAY OF URINE SODIUM: CPT

## 2019-09-23 PROCEDURE — 81003 URINALYSIS AUTO W/O SCOPE: CPT

## 2019-09-23 PROCEDURE — 82507 ASSAY OF CITRATE: CPT

## 2019-09-23 PROCEDURE — 82140 ASSAY OF AMMONIA: CPT

## 2019-09-23 PROCEDURE — 84560 ASSAY OF URINE/URIC ACID: CPT

## 2019-09-23 PROCEDURE — 84105 ASSAY OF URINE PHOSPHORUS: CPT

## 2019-09-23 PROCEDURE — 83935 ASSAY OF URINE OSMOLALITY: CPT

## 2019-09-23 PROCEDURE — 82131 AMINO ACIDS SINGLE QUANT: CPT

## 2019-09-23 PROCEDURE — 83735 ASSAY OF MAGNESIUM: CPT

## 2019-09-23 PROCEDURE — 84392 ASSAY OF URINE SULFATE: CPT

## 2019-09-23 PROCEDURE — 84133 ASSAY OF URINE POTASSIUM: CPT

## 2019-09-23 PROCEDURE — 82570 ASSAY OF URINE CREATININE: CPT

## 2019-10-02 LAB
AMMONIA 24H UR-MRATE: 11 MEQ/24 HR
AMMONIA UR-SCNC: ABNORMAL UG/DL
CA H2 PHOS DIHYD CRY URNS QL MICRO: 2.95 RATIO (ref 0–3)
CALCIUM 24H UR-MCNC: 11.6 MG/DL
CALCIUM 24H UR-MRATE: 72.5 MG/24 HR (ref 100–300)
CHLORIDE 24H UR-SCNC: 90 MMOL/L
CHLORIDE 24H UR-SRATE: 56 MMOL/24 HR (ref 110–250)
CITRATE 24H UR-MCNC: 389 MG/L
CITRATE 24H UR-MRATE: 243 MG/24 HR (ref 320–1240)
COM CRY STONE QL IR: 9.52 RATIO (ref 0–6)
CREAT 24H UR-MCNC: 102.8 MG/DL
CREAT 24H UR-MRATE: 642.5 MG/24 HR (ref 800–1800)
CYSTINE 24H UR-MCNC: 63.48 MG/L
CYSTINE 24H UR-MRATE: 39.68 MG/24 HR (ref 10–100)
MAGNESIUM 24H UR-MRATE: 22 MG/24 HR (ref 12–293)
MAGNESIUM UR-MCNC: 3.5 MG/DL
NA URATE CRY STONE QL IR: 6.48 RATIO (ref 0–4)
OSMOLALITY UR: 485 MOSMOL/KG (ref 300–900)
OXALATE 24H UR-MRATE: 22 MG/24 HR (ref 4–31)
OXALATE UR-MCNC: 35 MG/L
PH 24H UR: 6.6 [PH]
PHOSPHATE 24H UR-MCNC: 61.3 MG/DL
PHOSPHATE 24H UR-MRATE: 383.1 MG/24 HR (ref 400–1300)
PLEASE NOTE (STONE RISK): ABNORMAL
POTASSIUM 24H UR-SCNC: 16.3 MMOL/24 HR (ref 25–125)
POTASSIUM UR-SCNC: 26.1 MMOL/L
PRESERVED URINE: 625 ML/24 HR (ref 600–1600)
SODIUM 24H UR-SCNC: 128 MMOL/L
SODIUM 24H UR-SRATE: 80 MMOL/24 HR (ref 39–258)
SPECIMEN VOL 24H UR: 625 ML/24 HR (ref 600–1600)
SULFATE 24H UR-MCNC: 7 MEQ/24 HR (ref 0–30)
SULFATE UR-MCNC: 11 MEQ/L
TRI-PHOS CRY STONE MICRO: 0.08 RATIO (ref 0–1)
URATE 24H UR-MCNC: 43.2 MG/DL
URATE 24H UR-MRATE: 270 MG/24 HR (ref 250–750)
URATE DIHYD CRY STONE QL IR: 0.51 RATIO (ref 0–1.2)

## 2019-10-08 ENCOUNTER — TELEPHONE (OUTPATIENT)
Dept: UROLOGY | Facility: MEDICAL CENTER | Age: 64
End: 2019-10-08

## 2019-10-08 NOTE — TELEPHONE ENCOUNTER
----- Message from Mike Reyes MD sent at 10/7/2019  4:42 PM EDT -----  Please let her know that her urine volume was only 625 cc-if she did not collect all the urine for 24 hours, we need to repeat the test   If this is a true 24 test, then she simply needs to drink more water, enough that she is urinating at least 2 L per day     Also adding some lemon juice to the water would help because her citrate is low

## 2019-10-10 ENCOUNTER — TELEPHONE (OUTPATIENT)
Dept: UROLOGY | Facility: MEDICAL CENTER | Age: 64
End: 2019-10-10

## 2019-10-10 NOTE — TELEPHONE ENCOUNTER
Notes recorded by Cinthia Thornton MA on 10/9/2019 at 3:51 PM EDT  [facility] for pt to return call for results  ------    Notes recorded by Cinthia Thornton MA on 10/8/2019 at 10:28 AM EDT  [facility] for pt to return call for results

## 2019-10-10 NOTE — TELEPHONE ENCOUNTER
----- Message from Rosanna Baig MD sent at 10/7/2019  4:42 PM EDT -----  Please let her know that her urine volume was only 625 cc-if she did not collect all the urine for 24 hours, we need to repeat the test   If this is a true 24 test, then she simply needs to drink more water, enough that she is urinating at least 2 L per day     Also adding some lemon juice to the water would help because her citrate is low

## 2020-01-30 ENCOUNTER — DOCUMENTATION (OUTPATIENT)
Dept: MULTI SPECIALTY CLINIC | Facility: CLINIC | Age: 65
End: 2020-01-30

## 2020-01-30 DIAGNOSIS — M77.32 CALCANEAL SPUR OF FOOT, LEFT: Primary | ICD-10-CM

## 2020-02-06 ENCOUNTER — HOSPITAL ENCOUNTER (OUTPATIENT)
Dept: RADIOLOGY | Facility: HOSPITAL | Age: 65
Discharge: HOME | End: 2020-02-06
Attending: PODIATRIST
Payer: MEDICARE

## 2020-02-06 DIAGNOSIS — M77.32 CALCANEAL SPUR OF FOOT, LEFT: ICD-10-CM

## 2020-02-06 PROCEDURE — 73650 X-RAY EXAM OF HEEL: CPT

## 2020-03-16 ENCOUNTER — TELEPHONE (OUTPATIENT)
Dept: UROLOGY | Facility: MEDICAL CENTER | Age: 65
End: 2020-03-16

## 2020-06-23 ENCOUNTER — OFFICE VISIT (OUTPATIENT)
Dept: UROLOGY | Facility: MEDICAL CENTER | Age: 65
End: 2020-06-23

## 2020-06-23 VITALS
WEIGHT: 131 LBS | DIASTOLIC BLOOD PRESSURE: 60 MMHG | SYSTOLIC BLOOD PRESSURE: 124 MMHG | BODY MASS INDEX: 23.21 KG/M2 | TEMPERATURE: 98.4 F | HEIGHT: 63 IN | HEART RATE: 72 BPM

## 2020-06-23 DIAGNOSIS — N20.1 LEFT URETERAL CALCULUS: Primary | ICD-10-CM

## 2020-06-23 PROCEDURE — 99213 OFFICE O/P EST LOW 20 MIN: CPT | Performed by: UROLOGY

## 2020-06-23 RX ORDER — FOLIC ACID 1 MG/1
1000 TABLET ORAL DAILY
COMMUNITY
Start: 2020-04-14

## 2020-06-29 ENCOUNTER — DOCUMENTATION (OUTPATIENT)
Dept: OTHER | Facility: HOSPITAL | Age: 65
End: 2020-06-29

## 2020-06-29 DIAGNOSIS — Z01.818 PREOP EXAMINATION: Primary | ICD-10-CM

## 2020-07-02 ENCOUNTER — HOSPITAL ENCOUNTER (OUTPATIENT)
Dept: ULTRASOUND IMAGING | Facility: HOSPITAL | Age: 65
Discharge: HOME | End: 2020-07-02
Attending: UROLOGY
Payer: MEDICARE

## 2020-07-02 DIAGNOSIS — N20.1 LEFT URETERAL CALCULUS: ICD-10-CM

## 2020-07-02 PROCEDURE — 76770 US EXAM ABDO BACK WALL COMP: CPT

## 2020-07-13 ENCOUNTER — HOSPITAL ENCOUNTER (OUTPATIENT)
Dept: RADIOLOGY | Facility: HOSPITAL | Age: 65
Discharge: HOME | End: 2020-07-13
Attending: PODIATRIST
Payer: MEDICARE

## 2020-07-13 ENCOUNTER — TRANSCRIBE ORDERS (OUTPATIENT)
Dept: LAB | Facility: CLINIC | Age: 65
End: 2020-07-13

## 2020-07-13 ENCOUNTER — APPOINTMENT (OUTPATIENT)
Dept: LAB | Facility: CLINIC | Age: 65
End: 2020-07-13
Payer: MEDICARE

## 2020-07-13 DIAGNOSIS — Z01.818 PREOP EXAMINATION: ICD-10-CM

## 2020-07-13 LAB
ALBUMIN SERPL BCP-MCNC: 3.3 G/DL (ref 3.5–5)
ALP SERPL-CCNC: 98 U/L (ref 46–116)
ALT SERPL W P-5'-P-CCNC: 14 U/L (ref 12–78)
ANION GAP SERPL CALCULATED.3IONS-SCNC: 6 MMOL/L (ref 4–13)
AST SERPL W P-5'-P-CCNC: 16 U/L (ref 5–45)
BASOPHILS # BLD AUTO: 0.05 THOUSANDS/ΜL (ref 0–0.1)
BASOPHILS NFR BLD AUTO: 1 % (ref 0–1)
BILIRUB SERPL-MCNC: 0.42 MG/DL (ref 0.2–1)
BUN SERPL-MCNC: 17 MG/DL (ref 5–25)
CALCIUM SERPL-MCNC: 8.9 MG/DL (ref 8.3–10.1)
CHLORIDE SERPL-SCNC: 107 MMOL/L (ref 100–108)
CO2 SERPL-SCNC: 28 MMOL/L (ref 21–32)
CREAT SERPL-MCNC: 0.79 MG/DL (ref 0.6–1.3)
EOSINOPHIL # BLD AUTO: 0.19 THOUSAND/ΜL (ref 0–0.61)
EOSINOPHIL NFR BLD AUTO: 2 % (ref 0–6)
ERYTHROCYTE [DISTWIDTH] IN BLOOD BY AUTOMATED COUNT: 13.4 % (ref 11.6–15.1)
GFR SERPL CREATININE-BSD FRML MDRD: 79 ML/MIN/1.73SQ M
GLUCOSE P FAST SERPL-MCNC: 93 MG/DL (ref 65–99)
HCT VFR BLD AUTO: 39.1 % (ref 34.8–46.1)
HGB BLD-MCNC: 12 G/DL (ref 11.5–15.4)
IMM GRANULOCYTES # BLD AUTO: 0.04 THOUSAND/UL (ref 0–0.2)
IMM GRANULOCYTES NFR BLD AUTO: 1 % (ref 0–2)
LYMPHOCYTES # BLD AUTO: 2.82 THOUSANDS/ΜL (ref 0.6–4.47)
LYMPHOCYTES NFR BLD AUTO: 34 % (ref 14–44)
MCH RBC QN AUTO: 36.3 PG (ref 26.8–34.3)
MCHC RBC AUTO-ENTMCNC: 30.7 G/DL (ref 31.4–37.4)
MCV RBC AUTO: 118 FL (ref 82–98)
MONOCYTES # BLD AUTO: 0.85 THOUSAND/ΜL (ref 0.17–1.22)
MONOCYTES NFR BLD AUTO: 10 % (ref 4–12)
NEUTROPHILS # BLD AUTO: 4.32 THOUSANDS/ΜL (ref 1.85–7.62)
NEUTS SEG NFR BLD AUTO: 52 % (ref 43–75)
NRBC BLD AUTO-RTO: 0 /100 WBCS
PLATELET # BLD AUTO: 127 THOUSANDS/UL (ref 149–390)
PMV BLD AUTO: 9.5 FL (ref 8.9–12.7)
POTASSIUM SERPL-SCNC: 4.6 MMOL/L (ref 3.5–5.3)
PROT SERPL-MCNC: 8.4 G/DL (ref 6.4–8.2)
RBC # BLD AUTO: 3.31 MILLION/UL (ref 3.81–5.12)
SODIUM SERPL-SCNC: 141 MMOL/L (ref 136–145)
WBC # BLD AUTO: 8.27 THOUSAND/UL (ref 4.31–10.16)

## 2020-07-13 PROCEDURE — 80053 COMPREHEN METABOLIC PANEL: CPT

## 2020-07-13 PROCEDURE — 85025 COMPLETE CBC W/AUTO DIFF WBC: CPT

## 2020-07-13 PROCEDURE — 71046 X-RAY EXAM CHEST 2 VIEWS: CPT

## 2020-07-13 PROCEDURE — 36415 COLL VENOUS BLD VENIPUNCTURE: CPT

## 2020-07-18 DIAGNOSIS — Z11.59 SCREENING FOR VIRAL DISEASE: ICD-10-CM

## 2020-07-18 PROCEDURE — U0003 INFECTIOUS AGENT DETECTION BY NUCLEIC ACID (DNA OR RNA); SEVERE ACUTE RESPIRATORY SYNDROME CORONAVIRUS 2 (SARS-COV-2) (CORONAVIRUS DISEASE [COVID-19]), AMPLIFIED PROBE TECHNIQUE, MAKING USE OF HIGH THROUGHPUT TECHNOLOGIES AS DESCRIBED BY CMS-2020-01-R: HCPCS | Performed by: OBSTETRICS & GYNECOLOGY

## 2020-07-19 LAB
INPATIENT: NORMAL
SARS-COV-2 RNA SPEC QL NAA+PROBE: NOT DETECTED

## 2020-07-20 ENCOUNTER — ANESTHESIA EVENT (OUTPATIENT)
Dept: PERIOP | Facility: HOSPITAL | Age: 65
End: 2020-07-20
Payer: MEDICARE

## 2020-07-20 NOTE — PRE-PROCEDURE INSTRUCTIONS
Pre-Surgery Instructions:   Medication Instructions    alendronate (FOSAMAX) 70 mg tablet Instructed patient per Anesthesia Guidelines   folic acid (FOLVITE) 1 mg tablet Instructed patient per Anesthesia Guidelines   Lactobacillus (PROBIOTIC ACIDOPHILUS) CAPS Instructed patient per Anesthesia Guidelines   methotrexate 2 5 mg tablet Patient was instructed by Physician and understands   omeprazole (PriLOSEC) 20 mg delayed release capsule Instructed patient per Anesthesia Guidelines   Pirfenidone (ESBRIET) 267 MG TABS Instructed patient per Anesthesia Guidelines  Instructed to take omeprazole am of surgery with sip ofw ater per anesthesia

## 2020-07-23 ENCOUNTER — ANESTHESIA (OUTPATIENT)
Dept: PERIOP | Facility: HOSPITAL | Age: 65
End: 2020-07-23
Payer: MEDICARE

## 2020-07-23 ENCOUNTER — HOSPITAL ENCOUNTER (OUTPATIENT)
Facility: HOSPITAL | Age: 65
Setting detail: OUTPATIENT SURGERY
Discharge: HOME | End: 2020-07-23
Attending: PODIATRIST | Admitting: PODIATRIST
Payer: MEDICARE

## 2020-07-23 VITALS
SYSTOLIC BLOOD PRESSURE: 143 MMHG | HEIGHT: 63 IN | WEIGHT: 131 LBS | RESPIRATION RATE: 16 BRPM | HEART RATE: 73 BPM | DIASTOLIC BLOOD PRESSURE: 61 MMHG | BODY MASS INDEX: 23.21 KG/M2 | TEMPERATURE: 97.8 F | OXYGEN SATURATION: 100 %

## 2020-07-23 DIAGNOSIS — Z98.890 POST-OPERATIVE STATE: ICD-10-CM

## 2020-07-23 DIAGNOSIS — Z11.59 SCREENING FOR VIRAL DISEASE: Primary | ICD-10-CM

## 2020-07-23 RX ORDER — MAGNESIUM HYDROXIDE 1200 MG/15ML
LIQUID ORAL AS NEEDED
Status: DISCONTINUED | OUTPATIENT
Start: 2020-07-23 | End: 2020-07-23 | Stop reason: HOSPADM

## 2020-07-23 RX ORDER — GLYCOPYRROLATE 0.2 MG/ML
INJECTION INTRAMUSCULAR; INTRAVENOUS AS NEEDED
Status: DISCONTINUED | OUTPATIENT
Start: 2020-07-23 | End: 2020-07-23 | Stop reason: SURG

## 2020-07-23 RX ORDER — OXYCODONE HYDROCHLORIDE AND ACETAMINOPHEN 5; 325 MG/1; MG/1
1 TABLET ORAL EVERY 4 HOURS PRN
Qty: 20 TABLET | Refills: 0 | Status: SHIPPED | OUTPATIENT
Start: 2020-07-23

## 2020-07-23 RX ORDER — OXYCODONE HYDROCHLORIDE 5 MG/1
5 TABLET ORAL ONCE
Status: DISCONTINUED | OUTPATIENT
Start: 2020-07-23 | End: 2020-07-23 | Stop reason: HOSPADM

## 2020-07-23 RX ORDER — ONDANSETRON 2 MG/ML
4 INJECTION INTRAMUSCULAR; INTRAVENOUS ONCE AS NEEDED
Status: DISCONTINUED | OUTPATIENT
Start: 2020-07-23 | End: 2020-07-23 | Stop reason: HOSPADM

## 2020-07-23 RX ORDER — DEXAMETHASONE SODIUM PHOSPHATE 4 MG/ML
INJECTION, SOLUTION INTRA-ARTICULAR; INTRALESIONAL; INTRAMUSCULAR; INTRAVENOUS; SOFT TISSUE AS NEEDED
Status: DISCONTINUED | OUTPATIENT
Start: 2020-07-23 | End: 2020-07-23 | Stop reason: HOSPADM

## 2020-07-23 RX ORDER — BUPIVACAINE HYDROCHLORIDE AND EPINEPHRINE 2.5; 5 MG/ML; UG/ML
INJECTION, SOLUTION EPIDURAL; INFILTRATION; INTRACAUDAL; PERINEURAL AS NEEDED
Status: DISCONTINUED | OUTPATIENT
Start: 2020-07-23 | End: 2020-07-23 | Stop reason: HOSPADM

## 2020-07-23 RX ORDER — FENTANYL CITRATE 50 UG/ML
INJECTION, SOLUTION INTRAMUSCULAR; INTRAVENOUS AS NEEDED
Status: DISCONTINUED | OUTPATIENT
Start: 2020-07-23 | End: 2020-07-23 | Stop reason: SURG

## 2020-07-23 RX ORDER — ACETAMINOPHEN 325 MG/1
650 TABLET ORAL EVERY 6 HOURS PRN
Status: DISCONTINUED | OUTPATIENT
Start: 2020-07-23 | End: 2020-07-23 | Stop reason: HOSPADM

## 2020-07-23 RX ORDER — FENTANYL CITRATE/PF 50 MCG/ML
50 SYRINGE (ML) INJECTION
Status: DISCONTINUED | OUTPATIENT
Start: 2020-07-23 | End: 2020-07-23 | Stop reason: HOSPADM

## 2020-07-23 RX ORDER — ROCURONIUM BROMIDE 10 MG/ML
INJECTION, SOLUTION INTRAVENOUS AS NEEDED
Status: DISCONTINUED | OUTPATIENT
Start: 2020-07-23 | End: 2020-07-23 | Stop reason: SURG

## 2020-07-23 RX ORDER — CEFAZOLIN SODIUM 2 G/50ML
2000 SOLUTION INTRAVENOUS ONCE
Status: COMPLETED | OUTPATIENT
Start: 2020-07-23 | End: 2020-07-23

## 2020-07-23 RX ORDER — NEOSTIGMINE METHYLSULFATE 1 MG/ML
INJECTION INTRAVENOUS AS NEEDED
Status: DISCONTINUED | OUTPATIENT
Start: 2020-07-23 | End: 2020-07-23 | Stop reason: SURG

## 2020-07-23 RX ORDER — SUCCINYLCHOLINE/SOD CL,ISO/PF 100 MG/5ML
SYRINGE (ML) INTRAVENOUS AS NEEDED
Status: DISCONTINUED | OUTPATIENT
Start: 2020-07-23 | End: 2020-07-23 | Stop reason: SURG

## 2020-07-23 RX ORDER — SODIUM CHLORIDE 9 MG/ML
125 INJECTION, SOLUTION INTRAVENOUS CONTINUOUS
Status: DISCONTINUED | OUTPATIENT
Start: 2020-07-23 | End: 2020-07-23 | Stop reason: HOSPADM

## 2020-07-23 RX ORDER — PROPOFOL 10 MG/ML
INJECTION, EMULSION INTRAVENOUS AS NEEDED
Status: DISCONTINUED | OUTPATIENT
Start: 2020-07-23 | End: 2020-07-23 | Stop reason: SURG

## 2020-07-23 RX ORDER — HYDROMORPHONE HCL/PF 1 MG/ML
0.5 SYRINGE (ML) INJECTION
Status: DISCONTINUED | OUTPATIENT
Start: 2020-07-23 | End: 2020-07-23 | Stop reason: HOSPADM

## 2020-07-23 RX ORDER — DEXAMETHASONE SODIUM PHOSPHATE 4 MG/ML
INJECTION, SOLUTION INTRA-ARTICULAR; INTRALESIONAL; INTRAMUSCULAR; INTRAVENOUS; SOFT TISSUE AS NEEDED
Status: DISCONTINUED | OUTPATIENT
Start: 2020-07-23 | End: 2020-07-23 | Stop reason: SURG

## 2020-07-23 RX ORDER — MIDAZOLAM HYDROCHLORIDE 2 MG/2ML
INJECTION, SOLUTION INTRAMUSCULAR; INTRAVENOUS AS NEEDED
Status: DISCONTINUED | OUTPATIENT
Start: 2020-07-23 | End: 2020-07-23 | Stop reason: SURG

## 2020-07-23 RX ORDER — ONDANSETRON 2 MG/ML
INJECTION INTRAMUSCULAR; INTRAVENOUS AS NEEDED
Status: DISCONTINUED | OUTPATIENT
Start: 2020-07-23 | End: 2020-07-23 | Stop reason: SURG

## 2020-07-23 RX ORDER — ONDANSETRON 2 MG/ML
4 INJECTION INTRAMUSCULAR; INTRAVENOUS ONCE
Status: DISCONTINUED | OUTPATIENT
Start: 2020-07-23 | End: 2020-07-23 | Stop reason: HOSPADM

## 2020-07-23 RX ADMIN — PHENYLEPHRINE HYDROCHLORIDE 100 MCG: 10 INJECTION INTRAVENOUS at 08:02

## 2020-07-23 RX ADMIN — FENTANYL CITRATE 50 MCG: 50 INJECTION, SOLUTION INTRAMUSCULAR; INTRAVENOUS at 07:55

## 2020-07-23 RX ADMIN — CEFAZOLIN SODIUM 2000 MG: 2 SOLUTION INTRAVENOUS at 07:21

## 2020-07-23 RX ADMIN — ONDANSETRON 4 MG: 2 INJECTION INTRAMUSCULAR; INTRAVENOUS at 07:35

## 2020-07-23 RX ADMIN — DEXAMETHASONE SODIUM PHOSPHATE 4 MG: 4 INJECTION, SOLUTION INTRAMUSCULAR; INTRAVENOUS at 07:41

## 2020-07-23 RX ADMIN — GLYCOPYRROLATE 0.4 MG: 0.2 INJECTION, SOLUTION INTRAMUSCULAR; INTRAVENOUS at 08:04

## 2020-07-23 RX ADMIN — ROCURONIUM BROMIDE 10 MG: 10 INJECTION, SOLUTION INTRAVENOUS at 07:37

## 2020-07-23 RX ADMIN — SODIUM CHLORIDE 125 ML/HR: 0.9 INJECTION, SOLUTION INTRAVENOUS at 06:48

## 2020-07-23 RX ADMIN — PHENYLEPHRINE HYDROCHLORIDE 100 MCG: 10 INJECTION INTRAVENOUS at 07:47

## 2020-07-23 RX ADMIN — NEOSTIGMINE METHYLSULFATE 2 MG: 1 INJECTION, SOLUTION INTRAVENOUS at 08:06

## 2020-07-23 RX ADMIN — SODIUM CHLORIDE 125 ML/HR: 0.9 INJECTION, SOLUTION INTRAVENOUS at 08:46

## 2020-07-23 RX ADMIN — Medication 100 MG: at 07:37

## 2020-07-23 RX ADMIN — MIDAZOLAM 2 MG: 1 INJECTION INTRAMUSCULAR; INTRAVENOUS at 07:32

## 2020-07-23 RX ADMIN — PHENYLEPHRINE HYDROCHLORIDE 100 MCG: 10 INJECTION INTRAVENOUS at 07:50

## 2020-07-23 RX ADMIN — FENTANYL CITRATE 50 MCG: 50 INJECTION, SOLUTION INTRAMUSCULAR; INTRAVENOUS at 07:41

## 2020-07-23 RX ADMIN — PROPOFOL 200 MG: 10 INJECTION, EMULSION INTRAVENOUS at 07:37

## 2020-07-23 NOTE — ANESTHESIA POSTPROCEDURE EVALUATION
Post-Op Assessment Note    CV Status:  Stable  Pain Score: 1    Pain management: adequate     Mental Status:  Alert and awake   Hydration Status:  Euvolemic   PONV Controlled:  Controlled   Airway Patency:  Patent   Post Op Vitals Reviewed: Yes      Staff: Anesthesiologist           /65 (07/23/20 0837)    Temp      Pulse 90 (07/23/20 0837)   Resp 16 (07/23/20 0837)    SpO2 100 % (07/23/20 0837)

## 2020-07-23 NOTE — DISCHARGE INSTRUCTIONS
Dr Brunilda Newby Instructions    1  Take your prescribed medication as directed  2  Upon arrival at home, lie down and elevate your surgical foot on 2 pillows  3  Remain quiet, off your feet as much as possible, for the first 24-48 hours  This is when your feet first swell and may become painful  After 48 hours you may begin limited walking following these restrictions:   Weightbear as tolerated to surgical foot  4  Drink large quantities of water  Consume no alcohol  Continue a well-balanced diet  5  Report any unusual discomfort or fever to this office  6  A limited amount of discomfort and swelling is to be expected  In some cases the skin may take on a bruised appearance  The surgical solution that was applied to your foot prior to the operation is dark in color and the operation site may appear to be oozing when it actually is not  7  A slight amount of blood is to be expected, and is no cause for alarm  Do not remove the dressings  If there is active bleeding and if the bleeding persists, add additional gauze to the bandage, apply direct pressure, elevate your feet and call this office  8  Do not get the dressings wet  As regular bathing may be inconvenient, sponge baths are recommended  9  When anesthesia wears off and if any discomfort should be present, apply an ice pack directly over the operated area for 15 minute intervals for several hours or until the pain leaves  (USE IN EXCESS OF 15 MINUTES COULD CAUSE FROSTBITE)  Do not use hot water bags or electric pads  A convenient icepack can be made by placing ice cubes in a plastic bag and covering this with a towel  10  If necessary, take a mild laxative before retiring  11  Wear your special open shoes anytime you put weight on your foot, even if it is just to walk to the bathroom and back  It will probably be 2 or 3 weeks before you will be permitted to try regular shoes    12  Having performed the operation, we are interested in a prompt recovery  Please cooperate by following the above instructions  13  Please call to confirm your post-op appointment or call with any other questions

## 2020-07-23 NOTE — DISCHARGE SUMMARY
Discharge Summary Outpatient Procedure Podiatry -   Patricia Earl 72 y o  female MRN: [id number]  Unit/Bed#: [facility] Encounter: [id number]    Admission Date: 7/23/2020     Admitting Diagnosis: Plantar fascial fibromatosis [M72 2]    Discharge Diagnosis: same    Procedures Performed: E P F : 58833 (CPT®)    Complications: none    Condition at Discharge: stable    Discharge instructions/Information to patient and family:   See after visit summary for information provided to patient and family  Provisions for Follow-Up Care/Important appointments:  See after visit summary for information related to follow-up care and any pertinent home health orders  Discharge Medications:  See after visit summary for reconciled discharge medications provided to patient and family

## 2020-07-23 NOTE — OP NOTE
OPERATIVE REPORT - Podiatry  PATIENT NAME: Natalie Ballesteros    :  1955  MRN: [id number]  Pt Location: [facility] OR ROOM 01    SURGERY DATE: 2020    Surgeon(s) and Role:     * Evaristo Strickland DPM - Primary     * Nav Blair DPM - Assisting    Pre-op Diagnosis:  Plantar fascial fibromatosis [M72 2]    Post-Op Diagnosis Codes:     * Plantar fascial fibromatosis [M72 2]    Procedure(s) (LRB):  E P F  (Left)      Specimen(s):  * No specimens in log *    Estimated Blood Loss:   Minimal    Drains:  * No LDAs found *    Implants:  * No implants in log *    Anesthesia Type:   Choice with 20 ml of 1% Lidocaine and 0 5% Bupivacaine in a 1:1 mixture  10 cc's 0 25% bupivicaine with epinephrine post operatively     Hemostasis:  Surgical dissection  Pneumatic ankle tourniquet placed for 14 minutes    Materials:  Nylon suture    Operative Findings:  Plantar fascia shiny and intact without overt inflammatory pathology  Muscle belly observed following release showing no bleeding or signs of disruption  Complications:   None    Procedure and Technique:     Under mild sedation, the patient was brought into the operating room and placed on the operating room table in the supine position  A pneumatic tourniquet was then placed around the patient's left lower extremity with ample webril padding  A time out was performed to confirm the correct patient, procedure and site with all parties in agreement  Following IV sedation, a ankle  block was performed consisting of 20 ml of 1% Lidocaine and 0 25% Bupivacaine in a 1:1 mixture  The foot was then scrubbed, prepped and draped in the usual aseptic manner  An esmarch bandage was utilized to exsangunate the patients foot and the tourniquet was then inflated  The esmarch bandage was removed and the foot was placed on the operating room table  Attention was directed to the medial Left heel   A 15 blade was used to create a small stab incision approximately 3 inches anterior to the heel and 2 inches dorsal to the plantar heel  A hemostat was used to continue blunt dissection until the plantar fascia level was reached  The plantar fascia was strummed with a hemostat  Then an elevator was inserted superficial to the plantar fascia and passed along to the lateral side of the foot  This was removed  Then the obturator and cannula as one full unit were inserted in the same plane of tissue  It was passed along superficial to the plantar fascia and out towards the lateral skin edge  At this location a 15 blade was used to make a stab incision for the lateral skin portal  The obturator cannula was passed through the lateral portal  The outrigger was removed  Several cotton swabs were passed through the cannula to remove any loose fatty debris  Then a 2 7 mm camera was inserted into the lateral portal to observe the medial fascia  A blunt probe was used to help visualize the fascia  Then a triangular blade was used to release the fascia from a lateral to medial direction, the one third medial band of the plantar fascia was released well applying dorsiflexion force to the foot  After doing so the underlying muscle belly was visualized  The camera was removed and the site was flushed with saline  The obturator was placed back into the cannula and it was removed in its one full unit  The surgical incision was irrigated with copious amounts of normal sterile saline  Skin edges were reapproximated and closure was obtained utilizing interrupted retention sutures utilizing 4-0  Nylon  The foot was then cleansed and dried  A postoperative injection consisting of 10 ml of 0 5% Bupivacaine with epinephrine was performed  The incision site was dressed with Xeroform, 4x4 gauze, and ABD  This was then covered with a Kerlix and an ACE wrap  The tourniquet was deflated and normal hyperemic flush was noted to all digits   The patient tolerated the procedure and anesthesia well and was transported to the PACU with vital signs stable  Dr Carolyn Bradley was present during the entire procedure and participated in all key aspects  SIGNATURE: Jessica Reynolds DPM  DATE: July 23, 2020  TIME: 8:17 AM      Portions of the record may have been created with voice recognition software  Occasional wrong word or "sound a like" substitutions may have occurred due to the inherent limitations of voice recognition software  Read the chart carefully and recognize, using context, where substitutions have occurred

## 2020-07-23 NOTE — INTERVAL H&P NOTE
H&P reviewed  After examining the patient I find no changes in the patients condition since the H&P had been written      Vitals:    07/23/20 0625   BP: 137/63   Pulse: 78   Resp: 18   Temp: (!) 97 2 °F (36 2 °C)   SpO2: 97%

## 2020-07-23 NOTE — ANESTHESIA PREPROCEDURE EVALUATION
Review of Systems/Medical History  Patient summary reviewed  Chart reviewed  No history of anesthetic complications     Cardiovascular  Negative cardio ROS DVT (LLE)  PE,  Pulmonary  Asthma ,   Comment: Pulmonary fibrosis     GI/Hepatic    GERD well controlled,        Kidney stones,        Endo/Other  Negative endo/other ROS      GYN  Negative gynecology ROS          Hematology  Negative hematology ROS      Musculoskeletal    Arthritis     Neurology    Fibromyalgia   Psychology   Negative psychology ROS              Physical Exam    Airway    Mallampati score: II  TM Distance: >3 FB  Neck ROM: full     Dental   No notable dental hx     Cardiovascular  Comment: Negative ROS, Rhythm: regular, Rate: normal,     Pulmonary  Decreased breath sounds,     Other Findings        Anesthesia Plan  ASA Score- 3     Anesthesia Type- general with ASA Monitors  Additional Monitors:   Airway Plan: LMA  Comment: Patient seen and examined, history reviewed  Patient to be done under general anesthesia with LMA and routine monitors  Risks discussed with the patient, consent obtained        Plan Factors- Patient instructed to abstain from smoking on day of procedure  Patient did not smoke on day of surgery  Induction- intravenous  Postoperative Plan-     Informed Consent- Anesthetic plan and risks discussed with patient  I personally reviewed this patient with the CRNA  Discussed and agreed on the Anesthesia Plan with the CRNA  Ladi Arenas

## 2021-03-02 ENCOUNTER — HOSPITAL ENCOUNTER (OUTPATIENT)
Dept: VASCULAR ULTRASOUND | Facility: HOSPITAL | Age: 66
Discharge: HOME | End: 2021-03-02
Payer: MEDICARE

## 2021-03-02 ENCOUNTER — TRANSCRIBE ORDERS (OUTPATIENT)
Dept: ADMINISTRATIVE | Facility: HOSPITAL | Age: 66
End: 2021-03-02

## 2021-03-02 DIAGNOSIS — M79.604 PAIN IN BOTH LOWER EXTREMITIES: ICD-10-CM

## 2021-03-02 DIAGNOSIS — M79.89 LEG SWELLING: ICD-10-CM

## 2021-03-02 DIAGNOSIS — M79.604 PAIN IN BOTH LOWER EXTREMITIES: Primary | ICD-10-CM

## 2021-03-02 DIAGNOSIS — M79.605 PAIN IN BOTH LOWER EXTREMITIES: Primary | ICD-10-CM

## 2021-03-02 DIAGNOSIS — M79.605 PAIN IN BOTH LOWER EXTREMITIES: ICD-10-CM

## 2021-03-02 PROCEDURE — 93970 EXTREMITY STUDY: CPT

## 2021-03-03 PROCEDURE — 93970 EXTREMITY STUDY: CPT | Performed by: SURGERY

## 2021-05-22 ENCOUNTER — TRANSCRIBE ORDERS (OUTPATIENT)
Dept: LAB | Facility: CLINIC | Age: 66
End: 2021-05-22

## 2021-05-22 ENCOUNTER — APPOINTMENT (OUTPATIENT)
Dept: LAB | Facility: CLINIC | Age: 66
End: 2021-05-22
Payer: MEDICARE

## 2021-05-22 ENCOUNTER — HOSPITAL ENCOUNTER (OUTPATIENT)
Dept: RADIOLOGY | Facility: HOSPITAL | Age: 66
Discharge: HOME | End: 2021-05-22
Payer: MEDICARE

## 2021-05-22 ENCOUNTER — OFFICE VISIT (OUTPATIENT)
Dept: LAB | Facility: CLINIC | Age: 66
End: 2021-05-22
Payer: MEDICARE

## 2021-05-22 ENCOUNTER — TRANSCRIBE ORDERS (OUTPATIENT)
Dept: ADMINISTRATIVE | Facility: HOSPITAL | Age: 66
End: 2021-05-22

## 2021-05-22 DIAGNOSIS — Z01.810 PRE-OPERATIVE CARDIOVASCULAR EXAMINATION: ICD-10-CM

## 2021-05-22 DIAGNOSIS — Z01.810 PRE-OPERATIVE CARDIOVASCULAR EXAMINATION: Primary | ICD-10-CM

## 2021-05-22 LAB
ALBUMIN SERPL BCP-MCNC: 3.2 G/DL (ref 3.5–5)
ALP SERPL-CCNC: 98 U/L (ref 46–116)
ALT SERPL W P-5'-P-CCNC: 12 U/L (ref 12–78)
ANION GAP SERPL CALCULATED.3IONS-SCNC: 11 MMOL/L (ref 4–13)
AST SERPL W P-5'-P-CCNC: 21 U/L (ref 5–45)
BACTERIA UR QL AUTO: ABNORMAL /HPF
BASOPHILS # BLD AUTO: 0.06 THOUSANDS/ΜL (ref 0–0.1)
BASOPHILS NFR BLD AUTO: 1 % (ref 0–1)
BILIRUB SERPL-MCNC: 0.29 MG/DL (ref 0.2–1)
BILIRUB UR QL STRIP: NEGATIVE
BUN SERPL-MCNC: 12 MG/DL (ref 5–25)
CALCIUM ALBUM COR SERPL-MCNC: 9.2 MG/DL (ref 8.3–10.1)
CALCIUM SERPL-MCNC: 8.6 MG/DL (ref 8.3–10.1)
CHLORIDE SERPL-SCNC: 108 MMOL/L (ref 100–108)
CLARITY UR: ABNORMAL
CO2 SERPL-SCNC: 25 MMOL/L (ref 21–32)
COLOR UR: YELLOW
CREAT SERPL-MCNC: 0.83 MG/DL (ref 0.6–1.3)
EOSINOPHIL # BLD AUTO: 0.33 THOUSAND/ΜL (ref 0–0.61)
EOSINOPHIL NFR BLD AUTO: 5 % (ref 0–6)
ERYTHROCYTE [DISTWIDTH] IN BLOOD BY AUTOMATED COUNT: 14.1 % (ref 11.6–15.1)
EST. AVERAGE GLUCOSE BLD GHB EST-MCNC: 105 MG/DL
GFR SERPL CREATININE-BSD FRML MDRD: 74 ML/MIN/1.73SQ M
GLUCOSE P FAST SERPL-MCNC: 102 MG/DL (ref 65–99)
GLUCOSE UR STRIP-MCNC: NEGATIVE MG/DL
HBA1C MFR BLD: 5.3 %
HCT VFR BLD AUTO: 33.6 % (ref 34.8–46.1)
HGB BLD-MCNC: 10.8 G/DL (ref 11.5–15.4)
HGB UR QL STRIP.AUTO: NEGATIVE
IMM GRANULOCYTES # BLD AUTO: 0.02 THOUSAND/UL (ref 0–0.2)
IMM GRANULOCYTES NFR BLD AUTO: 0 % (ref 0–2)
KETONES UR STRIP-MCNC: NEGATIVE MG/DL
LEUKOCYTE ESTERASE UR QL STRIP: ABNORMAL
LYMPHOCYTES # BLD AUTO: 2.3 THOUSANDS/ΜL (ref 0.6–4.47)
LYMPHOCYTES NFR BLD AUTO: 31 % (ref 14–44)
MCH RBC QN AUTO: 33.6 PG (ref 26.8–34.3)
MCHC RBC AUTO-ENTMCNC: 32.1 G/DL (ref 31.4–37.4)
MCV RBC AUTO: 105 FL (ref 82–98)
MONOCYTES # BLD AUTO: 0.69 THOUSAND/ΜL (ref 0.17–1.22)
MONOCYTES NFR BLD AUTO: 9 % (ref 4–12)
MUCOUS THREADS UR QL AUTO: ABNORMAL
NEUTROPHILS # BLD AUTO: 3.94 THOUSANDS/ΜL (ref 1.85–7.62)
NEUTS SEG NFR BLD AUTO: 54 % (ref 43–75)
NITRITE UR QL STRIP: POSITIVE
NON-SQ EPI CELLS URNS QL MICRO: ABNORMAL /HPF
NRBC BLD AUTO-RTO: 0 /100 WBCS
PH UR STRIP.AUTO: 6 [PH]
PLATELET # BLD AUTO: 211 THOUSANDS/UL (ref 149–390)
PMV BLD AUTO: 9.7 FL (ref 8.9–12.7)
POTASSIUM SERPL-SCNC: 4.4 MMOL/L (ref 3.5–5.3)
PROT SERPL-MCNC: 8.3 G/DL (ref 6.4–8.2)
PROT UR STRIP-MCNC: NEGATIVE MG/DL
RBC # BLD AUTO: 3.21 MILLION/UL (ref 3.81–5.12)
RBC #/AREA URNS AUTO: ABNORMAL /HPF
SODIUM SERPL-SCNC: 144 MMOL/L (ref 136–145)
SP GR UR STRIP.AUTO: 1.02 (ref 1–1.03)
UROBILINOGEN UR QL STRIP.AUTO: 0.2 E.U./DL
WBC # BLD AUTO: 7.34 THOUSAND/UL (ref 4.31–10.16)
WBC #/AREA URNS AUTO: ABNORMAL /HPF

## 2021-05-22 PROCEDURE — 85025 COMPLETE CBC W/AUTO DIFF WBC: CPT

## 2021-05-22 PROCEDURE — 80053 COMPREHEN METABOLIC PANEL: CPT

## 2021-05-22 PROCEDURE — 71046 X-RAY EXAM CHEST 2 VIEWS: CPT

## 2021-05-22 PROCEDURE — 93005 ELECTROCARDIOGRAM TRACING: CPT

## 2021-05-22 PROCEDURE — 36415 COLL VENOUS BLD VENIPUNCTURE: CPT

## 2021-05-22 PROCEDURE — 83036 HEMOGLOBIN GLYCOSYLATED A1C: CPT

## 2021-05-22 PROCEDURE — 81001 URINALYSIS AUTO W/SCOPE: CPT | Performed by: FAMILY MEDICINE

## 2021-05-24 LAB
ATRIAL RATE: 70 BPM
P AXIS: -9 DEGREES
PR INTERVAL: 124 MS
QRS AXIS: 39 DEGREES
QRSD INTERVAL: 82 MS
QT INTERVAL: 390 MS
QTC INTERVAL: 421 MS
T WAVE AXIS: 14 DEGREES
VENTRICULAR RATE: 70 BPM

## 2021-05-24 PROCEDURE — 93010 ELECTROCARDIOGRAM REPORT: CPT | Performed by: INTERNAL MEDICINE

## 2021-06-09 ENCOUNTER — ANESTHESIA EVENT (OUTPATIENT)
Dept: PERIOP | Facility: HOSPITAL | Age: 66
End: 2021-06-09
Payer: MEDICARE

## 2021-06-09 NOTE — PRE-PROCEDURE INSTRUCTIONS
Patient says if dr Santiago Riley wants him to continue taking Furosemide 20 mg he needs a refill.  [facility] to [facility] Pre-Surgery Instructions:   Medication Instructions    alendronate (FOSAMAX) 70 mg tablet Patient was instructed by Physician and understands  Takes weekly-take as directed    folic acid (FOLVITE) 1 mg tablet Instructed patient per Anesthesia Guidelines  Stop pre op as of 6/9    Lactobacillus (PROBIOTIC ACIDOPHILUS) CAPS Instructed patient per Anesthesia Guidelines  Stop pre op as of 6/9    methotrexate 2 5 mg tablet Patient was instructed by Physician and understands  Takes weekly-take as directed    omeprazole (PriLOSEC) 20 mg delayed release capsule  Instructed patient per Anesthesia Guidelines  Take morning of surgery with sip water     Pirfenidone (ESBRIET) 267 MG TABS  Instructed patient per Anesthesia Guidelines  Take morning of surgery with sip water   Covid screening negative as per patient  Fully vaccinated  Advised pt to take pre op medicine according to anesthesia guidelines; however, patient verbalized that her surgeon instructed her to not take medicine morning of surgery  Advised patient to stop taking vitamins, herbal meds, NSAID'S and ASA pre op but may take Tylenol products if needed  Reviewed pre op showering instructions and advised NPO after MN and ASC will call with surgical scheduled time  Pt verbalized understanding

## 2021-06-10 ENCOUNTER — ANESTHESIA (OUTPATIENT)
Dept: PERIOP | Facility: HOSPITAL | Age: 66
End: 2021-06-10
Payer: MEDICARE

## 2021-06-10 ENCOUNTER — HOSPITAL ENCOUNTER (OUTPATIENT)
Facility: HOSPITAL | Age: 66
Setting detail: OUTPATIENT SURGERY
Discharge: HOME | End: 2021-06-10
Attending: PODIATRIST | Admitting: PODIATRIST
Payer: MEDICARE

## 2021-06-10 VITALS
HEART RATE: 82 BPM | DIASTOLIC BLOOD PRESSURE: 64 MMHG | SYSTOLIC BLOOD PRESSURE: 137 MMHG | BODY MASS INDEX: 23.57 KG/M2 | RESPIRATION RATE: 16 BRPM | HEIGHT: 63 IN | WEIGHT: 133 LBS | OXYGEN SATURATION: 99 % | TEMPERATURE: 98.2 F

## 2021-06-10 DIAGNOSIS — M72.2 PLANTAR FASCIAL FIBROMATOSIS: ICD-10-CM

## 2021-06-10 DIAGNOSIS — G89.18 POST-OPERATIVE PAIN: Primary | ICD-10-CM

## 2021-06-10 PROCEDURE — 88307 TISSUE EXAM BY PATHOLOGIST: CPT | Performed by: PATHOLOGY

## 2021-06-10 RX ORDER — LIDOCAINE HYDROCHLORIDE AND EPINEPHRINE 10; 10 MG/ML; UG/ML
INJECTION, SOLUTION INFILTRATION; PERINEURAL AS NEEDED
Status: DISCONTINUED | OUTPATIENT
Start: 2021-06-10 | End: 2021-06-10 | Stop reason: HOSPADM

## 2021-06-10 RX ORDER — ALBUTEROL SULFATE 2.5 MG/3ML
2.5 SOLUTION RESPIRATORY (INHALATION) ONCE AS NEEDED
Status: DISCONTINUED | OUTPATIENT
Start: 2021-06-10 | End: 2021-06-10 | Stop reason: HOSPADM

## 2021-06-10 RX ORDER — SODIUM CHLORIDE 9 MG/ML
125 INJECTION, SOLUTION INTRAVENOUS CONTINUOUS
Status: DISCONTINUED | OUTPATIENT
Start: 2021-06-10 | End: 2021-06-10 | Stop reason: HOSPADM

## 2021-06-10 RX ORDER — CEFAZOLIN SODIUM 1 G/50ML
1000 SOLUTION INTRAVENOUS ONCE
Status: DISCONTINUED | OUTPATIENT
Start: 2021-06-10 | End: 2021-06-10 | Stop reason: HOSPADM

## 2021-06-10 RX ORDER — FENTANYL CITRATE/PF 50 MCG/ML
25 SYRINGE (ML) INJECTION
Status: DISCONTINUED | OUTPATIENT
Start: 2021-06-10 | End: 2021-06-10 | Stop reason: HOSPADM

## 2021-06-10 RX ORDER — DEXAMETHASONE SODIUM PHOSPHATE 4 MG/ML
INJECTION, SOLUTION INTRA-ARTICULAR; INTRALESIONAL; INTRAMUSCULAR; INTRAVENOUS; SOFT TISSUE AS NEEDED
Status: DISCONTINUED | OUTPATIENT
Start: 2021-06-10 | End: 2021-06-10

## 2021-06-10 RX ORDER — FENTANYL CITRATE 50 UG/ML
INJECTION, SOLUTION INTRAMUSCULAR; INTRAVENOUS AS NEEDED
Status: DISCONTINUED | OUTPATIENT
Start: 2021-06-10 | End: 2021-06-10

## 2021-06-10 RX ORDER — MIDAZOLAM HYDROCHLORIDE 2 MG/2ML
INJECTION, SOLUTION INTRAMUSCULAR; INTRAVENOUS AS NEEDED
Status: DISCONTINUED | OUTPATIENT
Start: 2021-06-10 | End: 2021-06-10

## 2021-06-10 RX ORDER — MAGNESIUM HYDROXIDE 1200 MG/15ML
LIQUID ORAL AS NEEDED
Status: DISCONTINUED | OUTPATIENT
Start: 2021-06-10 | End: 2021-06-10 | Stop reason: HOSPADM

## 2021-06-10 RX ORDER — CEFAZOLIN SODIUM 1 G/50ML
SOLUTION INTRAVENOUS AS NEEDED
Status: DISCONTINUED | OUTPATIENT
Start: 2021-06-10 | End: 2021-06-10

## 2021-06-10 RX ORDER — ONDANSETRON 2 MG/ML
INJECTION INTRAMUSCULAR; INTRAVENOUS AS NEEDED
Status: DISCONTINUED | OUTPATIENT
Start: 2021-06-10 | End: 2021-06-10

## 2021-06-10 RX ORDER — OXYCODONE HYDROCHLORIDE AND ACETAMINOPHEN 5; 325 MG/1; MG/1
1 TABLET ORAL EVERY 4 HOURS PRN
Qty: 10 TABLET | Refills: 0 | Status: SHIPPED | OUTPATIENT
Start: 2021-06-10 | End: 2021-06-20

## 2021-06-10 RX ORDER — PROPOFOL 10 MG/ML
INJECTION, EMULSION INTRAVENOUS AS NEEDED
Status: DISCONTINUED | OUTPATIENT
Start: 2021-06-10 | End: 2021-06-10

## 2021-06-10 RX ORDER — OXYCODONE HYDROCHLORIDE 5 MG/1
5 TABLET ORAL EVERY 4 HOURS PRN
Status: DISCONTINUED | OUTPATIENT
Start: 2021-06-10 | End: 2021-06-10 | Stop reason: HOSPADM

## 2021-06-10 RX ADMIN — PROPOFOL 200 MG: 10 INJECTION, EMULSION INTRAVENOUS at 07:25

## 2021-06-10 RX ADMIN — CEFAZOLIN SODIUM 1000 MG: 1 SOLUTION INTRAVENOUS at 07:20

## 2021-06-10 RX ADMIN — DEXAMETHASONE SODIUM PHOSPHATE 4 MG: 4 INJECTION INTRA-ARTICULAR; INTRALESIONAL; INTRAMUSCULAR; INTRAVENOUS; SOFT TISSUE at 07:25

## 2021-06-10 RX ADMIN — SODIUM CHLORIDE 125 ML/HR: 0.9 INJECTION, SOLUTION INTRAVENOUS at 06:36

## 2021-06-10 RX ADMIN — FENTANYL CITRATE 25 MCG: 50 INJECTION INTRAMUSCULAR; INTRAVENOUS at 07:32

## 2021-06-10 RX ADMIN — MIDAZOLAM 2 MG: 1 INJECTION INTRAMUSCULAR; INTRAVENOUS at 07:21

## 2021-06-10 RX ADMIN — ONDANSETRON 4 MG: 2 INJECTION INTRAMUSCULAR; INTRAVENOUS at 07:25

## 2021-06-10 RX ADMIN — LIDOCAINE HYDROCHLORIDE 100 MG: 20 INJECTION, SOLUTION INTRAVENOUS at 07:25

## 2021-06-10 NOTE — INTERVAL H&P NOTE
H&P reviewed  After examining the patient I find no changes in the patients condition since the H&P had been written      Vitals:    06/10/21 0559   BP: 150/68   Pulse: 91   Resp: 16   Temp: 98 4 °F (36 9 °C)   SpO2: 97%

## 2021-06-10 NOTE — DISCHARGE INSTRUCTIONS
Dr Sarbjit Palomino Instructions    1  Take your prescribed medication as directed  2  Upon arrival at home, lie down and elevate your surgical foot on 2 pillows  3  Remain quiet, off your feet as much as possible, for the first 24-48 hours  This is when your feet first swell and may become painful  After 48 hours you may begin limited walking following these restrictions:   Weightbear as tolerated to surgical foot in post operative shoe  4  Drink large quantities of water  Consume no alcohol  Continue a well-balanced diet  5  Report any unusual discomfort or fever to this office  6  A limited amount of discomfort and swelling is to be expected  In some cases the skin may take on a bruised appearance  The surgical solution that was applied to your foot prior to the operation is dark in color and the operation site may appear to be oozing when it actually is not  7  A slight amount of blood is to be expected, and is no cause for alarm  Do not remove the dressings  If there is active bleeding and if the bleeding persists, add additional gauze to the bandage, apply direct pressure, elevate your feet and call this office  8  Do not get the dressings wet  As regular bathing may be inconvenient, sponge baths are recommended  9  When anesthesia wears off and if any discomfort should be present, apply an ice pack directly over the operated area for 15 minute intervals for several hours or until the pain leaves  (USE IN EXCESS OF 15 MINUTES COULD CAUSE FROSTBITE)  Do not use hot water bags or electric pads  A convenient icepack can be made by placing ice cubes in a plastic bag and covering this with a towel  10  If necessary, take a mild laxative before retiring  11  Wear your special open shoes anytime you put weight on your foot, even if it is just to walk to the bathroom and back  It will probably be 2 or 3 weeks before you will be permitted to try regular shoes    12  Having performed the operation, we are interested in a prompt recovery  Please cooperate by following the above instructions  13  Please call to confirm your post-op appointment or call with any other questions

## 2021-06-10 NOTE — OP NOTE
OPERATIVE REPORT - Podiatry  PATIENT NAME: Hiren Harman    :  1955  MRN: [id number]  Pt Location: [facility] OR ROOM 03    SURGERY DATE: 6/10/2021    Surgeon(s) and Role:     * Evaristo Strickland DPM - Primary     * Javier Rasmussen DPM - Assisting    Pre-op Diagnosis:  Plantar fascial fibromatosis [M72 2]    Post-Op Diagnosis Codes:     * Plantar fascial fibromatosis [M72 2]    Procedure(s) (LRB):  PLANTAR FIBROMA EXCISION (Left)    Specimen(s):  ID Type Source Tests Collected by Time Destination   1 : left foot fibroma Tissue Foot, Left TISSUE EXAM Evaristo Strickland DPM 6/10/2021 0741        Estimated Blood Loss:   Minimal    Drains:  * No LDAs found *    Anesthesia Type:   IV Sedation with Anesthesia with 10 ml of 1% Lidocaine and 0 5% Bupivacaine in a 1:1 mixture    Hemostasis:  Pneumatic ankle tourniquet set at 250 mmHg for 12 mins  Surgical dissection, direct compression, electrocautery    Materials:  * No implants in log *  3-0 Vicryl  T3-0 Nylon    Injectables:  10 mL 0 5% bupivacaine plain    Operative Findings:  Consistent with Diagnosis    Complications:   None    Procedure and Technique:     Under mild sedation, the patient was brought into the operating room and placed on the operating room table in the supine position  IV sedation was achieved by anesthesia team and a universal timeout was performed where all parties are in agreement of correct patient, correct procedure and correct site  A pneumatic tourniquet was then placed over the patient's left lower extremity with ample padding  A tibial block was performed consisting of 10 ml of 1% Lidocaine and 0 5% Bupivacaine in a 1:1 mixture  The foot was then prepped and draped in the usual aseptic manner  An esmarch bandage was used to exsangunate the foot and the pneumatic tourniquet was then inflated to 250 mmHg  Attention was then directed to the plantar aspect of the left foot    A palpable soft tissue mass was noted over the medial band of the plantar fascia, just distal to its insertion  A lazy-S incision which was made directly over the lesion  Blunt dissection was carried through the subcutaneous tissue with care taken to protect any vital neurovascular structures  The medial band of the plantar fascia was identified and the hypertrophic lesion was directly visualized and palpated  Utilizing an Allis clamp, the lesion was placed under tension and resected utilizing a tenotomy scissor  This was passed the back table  The lesion measured approximately 3 x 2 cm  This was marked and sent to pathology for evaluation  The surgical incision was irrigated with copious amounts of normal sterile saline  Subcutaneous closure was obtained utilizing 3-0 vicryl  Skin edges were reapproximated and closure was obtained utilizing 3-0 nylon  The foot was then cleansed and dried  A postoperative injection consisting of 10 ml of 0 5% Bupivacaine was performed  The incision site was dressed with Xeroform, gauze  This was then covered with a Monique and an ACE wrap  The tourniquet was deflated at approximately 12 min and normal hyperemic response was noted to all digits  The patient tolerated the procedure and anesthesia well without immediate complications and transferred to PACU with vital signs stable  Dr Rohit Hodges was present during the entire procedure and participated in all key aspects  SIGNATURE: Sue Silva DPM  DATE: [place] 10, 2021  TIME: 8:02 AM      Portions of the record may have been created with voice recognition software  Occasional wrong word or "sound a like" substitutions may have occurred due to the inherent limitations of voice recognition software  Read the chart carefully and recognize, using context, where substitutions have occurred

## 2021-06-10 NOTE — ANESTHESIA PREPROCEDURE EVALUATION
Procedure:  PLANTAR FIBROMA EXCISION (Left Foot)    Relevant Problems   MUSCULOSKELETAL   (+) Fibromyalgia   (+) Osteoarthritis      NEURO/PSYCH   (+) Fibromyalgia   (+) History of DVT with PE      Other   (+) Pulmonary fibrosis (HCC)        Physical Exam    Airway    Mallampati score: I  TM Distance: <3 FB  Neck ROM: full     Dental       Cardiovascular  Rhythm: regular, Rate: normal, Cardiovascular exam normal    Pulmonary  Pulmonary exam normal     Other Findings        Anesthesia Plan  ASA Score- 3     Anesthesia Type- general and IV sedation with anesthesia with ASA Monitors  Additional Monitors:   Airway Plan: LMA  Plan Factors-Exercise tolerance (METS): >4 METS  Chart reviewed  Patient is not a current smoker  Induction- intravenous  Postoperative Plan-     Informed Consent- Anesthetic plan and risks discussed with patient

## 2021-06-10 NOTE — DISCHARGE SUMMARY
Discharge Summary Outpatient Procedure Podiatry -   Landy Owens 77 y o  female MRN: [id number]  Unit/Bed#: [facility] [place] Encounter: [id number]    Admission Date: 6/10/2021     Admitting Diagnosis: Plantar fascial fibromatosis [M72 2]    Discharge Diagnosis: same    Procedures Performed: PLANTAR FIBROMA EXCISION: 03464 (CPT®)    Complications: none    Condition at Discharge: stable    Discharge instructions/Information to patient and family:   See after visit summary for information provided to patient and family  Provisions for Follow-Up Care/Important appointments:  See after visit summary for information related to follow-up care and any pertinent home health orders  Discharge Medications:  See after visit summary for reconciled discharge medications provided to patient and family

## 2021-08-07 NOTE — ADDENDUM NOTE
Addendum  created 09/04/19 0848 by Yodit Flaherty CRNA    Intraprocedure Meds edited Patient : Alba Parikh Age: 36 year old Sex: female   MRN: [id number] Encounter Date: 8/7/2021    G15/G15   History     Chief Complaint   Patient presents with   • Leg Swelling     HPI     5:29 PM the pt presents to the ED with LLE swelling and pain.  She notes that she's concerned for a DVT.  She does have a h/o DVT and is currently on Eliquis, and is taking it as prescribed.  She was diagnosed back in April, and has been on Eliquis since.  She had the DVT discovered in the LLE, and PEs found as well.  Today, the LLE is worse, spreading up to the hip.  It is associated with pain, in particular with movement, though it's not severe.  She was thought to have gotten the clot due to birth control pills.  Today, she denies CP, SOB or fever.    Allergies   Allergen Reactions   • Peanut   (Food Or Med) ANAPHYLAXIS   • Peanuts [Peanut - Dietary Use Only] THROAT SWELLING     Throat swelling, itching, nausea   • Tree Nuts    (Food) ANAPHYLAXIS   • Amoxicillin RASH and PRURITUS     Does not remember reaction   • Bee Sting SWELLING     Pt states tongue swelling itching throat.    • Dust Other (See Comments)     sneezing   • Iodine   (Environmental Or Med) PRURITUS   • Latex   (Environmental) PRURITUS   • Seasonal        Current Discharge Medication List      Prior to Admission Medications    Details   apixaBAN (Eliquis) 5 MG Tab Take 1 tablet by mouth 2 (two) times daily  Qty: 60 tablet, Refills: 2      clindamycin (CLEOCIN) 300 MG capsule Take 1 capsule by mouth every 6 hours.  Qty: 40 capsule, Refills: 0      traMADol (ULTRAM) 50 MG tablet Take 1 tablet by mouth every 12 hours as needed for Pain.  Qty: 10 tablet, Refills: 0      EPINEPHrine 0.3 MG/0.3ML auto-injector Inject 0.3 mL into the muscle 1 time as needed for Anaphylaxis.  Qty: 2 each, Refills: 1      apixaBAN (ELIQUIS) 5 MG Tab Take 1 tablet by mouth 2 times daily.  Qty: 60 tablet, Refills: 1      metoPROLOL succinate (TOPROL-XL) 25 MG 24 hr tablet Take 1 Tablet by  mouth once daily  Qty: 90 tablet, Refills: 0      apixaBAN (ELIQUIS) 5 MG Tab Take 1 tablet by mouth twice daily  Qty: 60 tablet, Refills: 1      HYDROcodone-acetaminophen (NORCO) 5-325 MG per tablet Take 1 tablet by mouth every 4-6 hours as needed for pain  Qty: 20 tablet, Refills: 0      mirtazapine (REMERON) 30 MG tablet Take 1 tablet by mouth at bedtime.  Qty: 30 tablet, Refills: 1      levETIRAcetam (KepPRA) 500 MG tablet Take 1.5 tablets by mouth 2 times daily.  Qty: 90 tablet, Refills: 1      norgestimate-ethinyl estradiol, triphasic, (ORTHO TRI-CYCLEN) 0.18/0.215/0.25 MG-35 MCG tablet Take 1 tablet by mouth daily.  Qty: 84 tablet, Refills: 1      cyclobenzaprine (FLEXERIL) 5 MG tablet Take 1 tablet by mouth 3 times daily as needed for Muscle spasms.  Qty: 30 tablet, Refills: 0      boric acid 600 mg vaginal suppository Insert 1 suppository vaginally nightly at bedtime  Qty: 30 g, Refills: 2      traMADol (ULTRAM) 50 MG tablet Take 1 tablet by mouth 4 times daily as needed for pain.  Qty: 60 tablet, Refills: 0      benzonatate (TESSALON PERLES) 200 MG capsule Take 1 capsule by mouth 3 times daily as needed for Cough.  Qty: 30 capsule, Refills: 0      metroNIDAZOLE (METROGEL-VAGINAL) 0.75 % vaginal gel Apply externally to the affected area twice per week  Qty: 70 g, Refills: 4      metoCLOPramide (REGLAN) 10 MG tablet Take 1 tablet by mouth 4 times daily.  Qty: 80 tablet, Refills: 3      ROWEEPRA 500 MG tablet TAKE ONE AND ONE-HALF TABLET BY MOUTH TWICE DAILY  Qty: 90 tablet, Refills: 1      diphenhydrAMINE (BENADRYL) 25 MG capsule       metroNIDAZOLE (FLAGYL) 500 MG tablet TK 1 T PO BID FOR 7 DAYS. AVOID ALCOHOL WHILE TAKING AND FOR 72 H FOLLOWING LAST DOSE  Refills: 0      cannabidiol (CBD) oil       albuterol (VENTOLIN) (2.5 MG/3ML) 0.083% nebulizer solution Take 3 mLs by nebulization every 6 hours as needed for Wheezing.  Qty: 375 mL, Refills: 0      cyclobenzaprine (FLEXERIL) 10 MG tablet TAKE 1 TABLET BY  MOUTH THREE TIMES DAILY AS NEEDED FOR MUSCLE SPASMS  Qty: 30 tablet, Refills: 1      gabapentin (NEURONTIN) 100 MG capsule Take 1 capsule by mouth two times daily.  Qty: 60 capsule, Refills: 4      albuterol 108 (90 BASE) MCG/ACT inhaler Inhale 2 puffs into the lungs every 4 hours as needed for Wheezing.  Qty: 8.5 g, Refills: 1             Past Medical History:   Diagnosis Date   • Allergy    • Anxiety    • Bipolar 1 disorder (CMS/formerly [facility])    • Black-out (not amnesia)    • CTS (carpal tunnel syndrome)    • Depressive disorder    • DVT (deep venous thrombosis) (CMS/formerly [facility])    • Epilepsy (CMS/formerly [facility]) 5/26/2015   • Essential (primary) hypertension    • Fibromyalgia    • OCD (obsessive compulsive disorder)    • RAD (reactive airway disease)    • Seizures (CMS/formerly [facility])     last seizure 3 yrs ago, 2012   • SVT (supraventricular tachycardia) (CMS/formerly [facility])        Past Surgical History:   Procedure Laterality Date   • TUBAL LIGATION         Family History   Problem Relation Age of Onset   • High blood pressure Mother    • Asthma Mother    • Allergic Rhinitis Mother    • High blood pressure Father    • Diabetes Father    • Asthma Father    • Allergic Rhinitis Father    • Diabetes Paternal Aunt    • Diabetes Maternal Grandmother    • Diabetes Paternal Grandmother    • Cancer Paternal Grandmother    • Allergic Rhinitis Sister    • Rheumatologic Disease Sister         SLE       Social History     Tobacco Use   • Smoking status: Former Smoker     Packs/day: 0.30     Years: 6.00     Pack years: 1.80     Types: Cigarettes     Start date: 1/8/2010     Quit date: 10/28/2017     Years since quitting: 3.7   • Smokeless tobacco: Never Used   Substance Use Topics   • Alcohol use: Yes     Alcohol/week: 0.0 standard drinks     Comment: seldom   • Drug use: Yes     Types: Marijuana     Comment: today       E-cigarette/Vaping     E-Cigarette/Vaping Substances & Devices       Review of Systems   Constitutional: Negative for chills and fever.   HENT: Negative  for congestion, ear pain and sore throat.    Eyes: Negative for pain.   Respiratory: Negative for shortness of breath.    Cardiovascular: Negative for chest pain.   Gastrointestinal: Negative for abdominal pain.   Genitourinary: Negative for difficulty urinating.   Musculoskeletal: Positive for myalgias. Negative for arthralgias.   Skin: Negative for rash.   Neurological: Negative for headaches.   Hematological: Does not bruise/bleed easily.       Physical Exam     ED Triage Vitals [08/07/21 1644]   ED Triage Vitals Group      Temp 98.4 °F (36.9 °C)      Heart Rate 98      Resp 18      BP (!) 141/90      SpO2 96 %      EtCO2 mmHg       Height       Weight       Weight Scale Used       BMI (Calculated)       IBW/kg (Calculated)        Physical Exam  Vitals and nursing note reviewed.   Constitutional:       General: She is not in acute distress.     Appearance: She is well-developed. She is not diaphoretic.   HENT:      Head: Normocephalic and atraumatic.      Mouth/Throat:      Pharynx: No oropharyngeal exudate.   Eyes:      General: No scleral icterus.        Right eye: No discharge.         Left eye: No discharge.      Conjunctiva/sclera: Conjunctivae normal.      Pupils: Pupils are equal, round, and reactive to light.   Neck:      Vascular: No JVD.      Trachea: No tracheal deviation.   Cardiovascular:      Rate and Rhythm: Normal rate and regular rhythm.      Pulses:           Dorsalis pedis pulses are 2+ on the left side.        Posterior tibial pulses are 2+ on the left side.      Heart sounds: Normal heart sounds. No murmur heard.   No friction rub. No gallop.    Pulmonary:      Effort: Pulmonary effort is normal. No respiratory distress.      Breath sounds: Normal breath sounds. No stridor. No wheezing or rales.   Abdominal:      General: Bowel sounds are normal. There is no distension.      Palpations: Abdomen is soft. There is no mass.      Tenderness: There is no abdominal tenderness. There is no guarding  or rebound.   Musculoskeletal:         General: No swelling (no discernable LLE edema.  no TTP of the LLE) or deformity. Normal range of motion.      Cervical back: Normal range of motion and neck supple.   Lymphadenopathy:      Cervical: No cervical adenopathy.   Skin:     General: Skin is warm and dry.      Coloration: Skin is not pale.      Findings: No erythema or rash.   Neurological:      Mental Status: She is alert and oriented to person, place, and time.      GCS: GCS eye subscore is 4. GCS verbal subscore is 5. GCS motor subscore is 6.      Motor: No abnormal muscle tone.      Coordination: Coordination normal.   Psychiatric:         Mood and Affect: Mood normal.         Behavior: Behavior normal.         Thought Content: Thought content normal.         ED Course     Procedures         Chart Review    Differential Diagnoses    Labs  No results found for this visit on 08/07/21.    Radiology  US Lower Extremity Venous Duplex Left   Final Result   IMPRESSION: No sonographic evidence of deep venous thrombosis involving the   left lower extremity.           ED Medication Orders (From admission, onward)    None            ED Course    8:08 PM I rechecked the patient who is resting comfortably in bed.  I updated the pt on her workup results and discussed the likely etiology of her symptoms.  I advised that the pt follow up with her PCP.  The pt was instructed to return to the ED in case of worsening sx or development of new sx.  The pt verbalizes understanding and agrees with the plan.  All questions and concerns were addressed at the time.    MDM    Diagnosis  The encounter diagnosis was Pain of left lower extremity.    Yuliya Mckay PA-C  [address] [place] [id number]  [phone]    Call in 2 days         New Prescriptions    No medications on file       Yonathan Lopez PA-C  08/07/21 2834

## 2021-08-24 ENCOUNTER — IMMUNIZATIONS (OUTPATIENT)
Dept: FAMILY MEDICINE CLINIC | Facility: HOSPITAL | Age: 66
End: 2021-08-24

## 2021-08-24 DIAGNOSIS — Z23 ENCOUNTER FOR IMMUNIZATION: Primary | ICD-10-CM

## 2021-08-24 PROCEDURE — 91301 SARS-COV-2 / COVID-19 MRNA VACCINE (MODERNA) 100 MCG: CPT

## 2021-08-24 PROCEDURE — 0011A SARS-COV-2 / COVID-19 MRNA VACCINE (MODERNA) 100 MCG: CPT

## 2021-12-10 ENCOUNTER — APPOINTMENT (EMERGENCY)
Dept: RADIOLOGY | Facility: HOSPITAL | Age: 66
End: 2021-12-10
Payer: MEDICARE

## 2021-12-10 ENCOUNTER — HOSPITAL ENCOUNTER (EMERGENCY)
Facility: HOSPITAL | Age: 66
Discharge: HOME | End: 2021-12-10
Attending: EMERGENCY MEDICINE
Payer: MEDICARE

## 2021-12-10 VITALS
RESPIRATION RATE: 20 BRPM | OXYGEN SATURATION: 98 % | DIASTOLIC BLOOD PRESSURE: 65 MMHG | HEART RATE: 80 BPM | TEMPERATURE: 97.9 F | SYSTOLIC BLOOD PRESSURE: 139 MMHG

## 2021-12-10 DIAGNOSIS — S73.014A POSTERIOR DISLOCATION OF RIGHT HIP, INITIAL ENCOUNTER (HCC): Primary | ICD-10-CM

## 2021-12-10 PROCEDURE — 99285 EMERGENCY DEPT VISIT HI MDM: CPT | Performed by: EMERGENCY MEDICINE

## 2021-12-10 PROCEDURE — 99152 MOD SED SAME PHYS/QHP 5/>YRS: CPT | Performed by: EMERGENCY MEDICINE

## 2021-12-10 PROCEDURE — 96375 TX/PRO/DX INJ NEW DRUG ADDON: CPT

## 2021-12-10 PROCEDURE — 99284 EMERGENCY DEPT VISIT MOD MDM: CPT

## 2021-12-10 PROCEDURE — 73501 X-RAY EXAM HIP UNI 1 VIEW: CPT

## 2021-12-10 PROCEDURE — 27265 TREAT HIP DISLOCATION: CPT | Performed by: EMERGENCY MEDICINE

## 2021-12-10 PROCEDURE — 73502 X-RAY EXAM HIP UNI 2-3 VIEWS: CPT

## 2021-12-10 PROCEDURE — 96374 THER/PROPH/DIAG INJ IV PUSH: CPT

## 2021-12-10 PROCEDURE — 96361 HYDRATE IV INFUSION ADD-ON: CPT

## 2021-12-10 RX ORDER — PROPOFOL 10 MG/ML
1 INJECTION, EMULSION INTRAVENOUS ONCE
Status: COMPLETED | OUTPATIENT
Start: 2021-12-10 | End: 2021-12-10

## 2021-12-10 RX ORDER — ONDANSETRON 2 MG/ML
4 INJECTION INTRAMUSCULAR; INTRAVENOUS ONCE
Status: COMPLETED | OUTPATIENT
Start: 2021-12-10 | End: 2021-12-10

## 2021-12-10 RX ORDER — FENTANYL CITRATE 50 UG/ML
50 INJECTION, SOLUTION INTRAMUSCULAR; INTRAVENOUS ONCE
Status: DISCONTINUED | OUTPATIENT
Start: 2021-12-10 | End: 2021-12-10 | Stop reason: HOSPADM

## 2021-12-10 RX ORDER — FENTANYL CITRATE 50 UG/ML
50 INJECTION, SOLUTION INTRAMUSCULAR; INTRAVENOUS ONCE
Status: COMPLETED | OUTPATIENT
Start: 2021-12-10 | End: 2021-12-10

## 2021-12-10 RX ADMIN — ONDANSETRON 4 MG: 2 INJECTION INTRAMUSCULAR; INTRAVENOUS at 11:21

## 2021-12-10 RX ADMIN — FENTANYL CITRATE 50 MCG: 50 INJECTION INTRAMUSCULAR; INTRAVENOUS at 11:21

## 2021-12-10 RX ADMIN — PROPOFOL 100 MG: 10 INJECTION, EMULSION INTRAVENOUS at 12:40

## 2021-12-10 RX ADMIN — SODIUM CHLORIDE 1000 ML: 0.9 INJECTION, SOLUTION INTRAVENOUS at 11:22

## 2021-12-23 ENCOUNTER — OFFICE VISIT (OUTPATIENT)
Dept: OBGYN CLINIC | Facility: HOSPITAL | Age: 66
End: 2021-12-23
Payer: MEDICARE

## 2021-12-23 VITALS
HEART RATE: 89 BPM | HEIGHT: 63 IN | DIASTOLIC BLOOD PRESSURE: 80 MMHG | WEIGHT: 131 LBS | SYSTOLIC BLOOD PRESSURE: 127 MMHG | BODY MASS INDEX: 23.21 KG/M2

## 2021-12-23 DIAGNOSIS — S73.014A POSTERIOR DISLOCATION OF RIGHT HIP, INITIAL ENCOUNTER (HCC): ICD-10-CM

## 2021-12-23 PROCEDURE — 1124F ACP DISCUSS-NO DSCNMKR DOCD: CPT | Performed by: ORTHOPAEDIC SURGERY

## 2021-12-23 PROCEDURE — 99203 OFFICE O/P NEW LOW 30 MIN: CPT | Performed by: ORTHOPAEDIC SURGERY

## 2023-02-24 ENCOUNTER — APPOINTMENT (EMERGENCY)
Dept: RADIOLOGY | Facility: HOSPITAL | Age: 68
End: 2023-02-24

## 2023-02-24 ENCOUNTER — HOSPITAL ENCOUNTER (EMERGENCY)
Facility: HOSPITAL | Age: 68
Discharge: HOME | End: 2023-02-24
Attending: EMERGENCY MEDICINE

## 2023-02-24 VITALS
TEMPERATURE: 98.4 F | WEIGHT: 121 LBS | SYSTOLIC BLOOD PRESSURE: 139 MMHG | OXYGEN SATURATION: 100 % | HEIGHT: 63 IN | RESPIRATION RATE: 20 BRPM | HEART RATE: 88 BPM | DIASTOLIC BLOOD PRESSURE: 62 MMHG | BODY MASS INDEX: 21.44 KG/M2

## 2023-02-24 DIAGNOSIS — S73.014A: ICD-10-CM

## 2023-02-24 DIAGNOSIS — S73.004A HIP DISLOCATION, RIGHT (HCC): ICD-10-CM

## 2023-02-24 DIAGNOSIS — M25.551 RIGHT HIP PAIN: Primary | ICD-10-CM

## 2023-02-24 RX ORDER — FENTANYL CITRATE 50 UG/ML
1 INJECTION, SOLUTION INTRAMUSCULAR; INTRAVENOUS ONCE
Status: COMPLETED | OUTPATIENT
Start: 2023-02-24 | End: 2023-02-24

## 2023-02-24 RX ORDER — ONDANSETRON 2 MG/ML
4 INJECTION INTRAMUSCULAR; INTRAVENOUS ONCE
Status: COMPLETED | OUTPATIENT
Start: 2023-02-24 | End: 2023-02-24

## 2023-02-24 RX ORDER — PROPOFOL 10 MG/ML
100 INJECTION, EMULSION INTRAVENOUS ONCE
Status: COMPLETED | OUTPATIENT
Start: 2023-02-24 | End: 2023-02-24

## 2023-02-24 RX ORDER — FENTANYL CITRATE 50 UG/ML
50 INJECTION, SOLUTION INTRAMUSCULAR; INTRAVENOUS ONCE
Status: DISCONTINUED | OUTPATIENT
Start: 2023-02-24 | End: 2023-02-25 | Stop reason: HOSPADM

## 2023-02-24 RX ORDER — KETAMINE HYDROCHLORIDE 100 MG/ML
1 INJECTION, SOLUTION INTRAMUSCULAR; INTRAVENOUS ONCE
Status: COMPLETED | OUTPATIENT
Start: 2023-02-24 | End: 2023-02-24

## 2023-02-24 RX ADMIN — ONDANSETRON 4 MG: 2 INJECTION INTRAMUSCULAR; INTRAVENOUS at 20:05

## 2023-02-24 RX ADMIN — SODIUM CHLORIDE 1000 ML: 0.9 INJECTION, SOLUTION INTRAVENOUS at 20:05

## 2023-02-24 RX ADMIN — PROPOFOL 60 MG: 10 INJECTION, EMULSION INTRAVENOUS at 20:53

## 2023-02-25 NOTE — ED ATTENDING ATTESTATION
2/24/2023  IBrian MD, saw and evaluated the patient  I have discussed the patient with the resident/non-physician practitioner and agree with the resident's/non-physician practitioner's findings, Plan of Care, and MDM as documented in the resident's/non-physician practitioner's note, except where noted  All available labs and Radiology studies were reviewed  I was present for key portions of any procedure(s) performed by the resident/non-physician practitioner and I was immediately available to provide assistance  At this point I agree with the current assessment done in the Emergency Department  I have conducted an independent evaluation of this patient a history and physical is as follows: This is a 76 y o  old female who presents to the ED for evaluation of hip dislocation  Patient went to move in bed and felt a pop and her R hip (prosthesis) dislocated  EMS was called but had significant difficulty removing patient from the residence due to severe hoarding conditions in the home  I was consulted for medical command and IV ketamine for sedation was given  Upon arrival, patient was hemodynamically stable and emerging from the ketamine, but was calm and cooperative  She would follow simple commands but could not speak in full sentences  She was able to willing her toes on the R foot and had R hip tenderness  No other trauma or complaints  VS and nursing notes reviewed  General: Appears in NAD, awake, alert, speaking normally in full sentences  Well-nourished, well-developed  Appears stated age  Head: Normocephalic, atraumatic  Eyes: EOMI  Vision grossly normal  No subconjunctival hemorrhages or occular discharge noted  Symmetrical lids  ENT: Atraumatic external nose and ears  No stridor  Normal phonation  No drooling  Normal swallowing  Neck: No JVD  FROM  No goiter  CV: No pallor  Normal rate  Lungs: No tachypnea  No respiratory distress  MSK: RLE shortened internally rotated  2+ DP pulses, sensation intact to light touch  no peripheral edema  Skin: Dry, intact  No cyanosis  Neuro: Awake, alert, GCS15  CN II-XII grossly intact  Grossly normal gait  Psychiatric/Behavioral: Appropriate mood and affect  A/P: This is a 76 y o  female who presents to the ED for evaluation of hip dislocation  Will sedate and reduce  Appears NV intact at this time  XR rule out periprostetic fx  Post reduction, we were contacted by the [facility] Department  Expressing concerns over patients MH, want to make sure she is offered assistance for her hoarding  Confirmed with [employer], no legal action against home, code enforcement isn't involved and patient is allowed to return home  [facility] will [facility] resources to patient prior to discharge to ensure she is safe to go home and has a safe living environment  Her  is at her bedside and agrees to take her home  ED Course  ED Course as of 02/25/23 1548   Fri Feb 24, 2023   2108 Hip reduced  Pending xr confirmation at this time     Amanda 77 with Zhang Ray at [facility] Time  Procedures

## 2023-02-25 NOTE — ED PROCEDURE NOTE
Procedure  Procedural Sedation    Date/Time: 2/24/2023 9:12 PM  Performed by: Benita Muñoz MD  Authorized by: Benita Muñoz MD     Immediate pre-procedure details:     Reviewed: vital signs and relevant labs/tests      Verified: bag valve mask available, emergency equipment available, intubation equipment available, IV patency confirmed and oxygen available    Procedure details (see MAR for exact dosages):     Preoxygenation:  Room air and nasal cannula    Sedation:  Propofol    Intra-procedure monitoring:  Blood pressure monitoring, continuous capnometry, frequent vital sign checks, continuous pulse oximetry and cardiac monitor    Intra-procedure events: none      Sedation end time:  2/24/2023 9:00 PM    Total sedation time (minutes):  10  Post-procedure details:     Attendance: Constant attendance by certified staff until patient recovered      Recovery: Patient returned to pre-procedure baseline      Post-sedation assessments completed and reviewed: airway patency, mental status and respiratory function      Patient tolerance:   Tolerated well, no immediate complications                     Benita Muñoz MD  02/24/23 2119

## 2023-02-25 NOTE — DISCHARGE INSTRUCTIONS
Do not bend the hip past 90 degrees  Do not cross midline with your right hip  Please keep your hip in abduction pillow when sleeping  When you are in bed, please keep your toes and knees pointed towards the ceiling  You need to follow-up with orthopedic surgery due to the fact that this has occurred multiple times now

## 2023-02-25 NOTE — ED PROVIDER NOTES
History  Chief Complaint   Patient presents with   • Hip Injury     Pt reports having a hip replacement previously and was turning in bed to grab something and felt her hip right hip pop  + deformity and shortening  68-year-old female is presenting with right hip pain and deformity  Patient has a history of total arthroplasty of the right hip in 2014 which has been complicated by several dislocations in the past most recently in 2021  Patient states that earlier this afternoon she was reaching across her bed for an item on the floor and states that she felt an immediate pop  Patient reports that her right lower extremity is now shorter and kept in internal rotation with limited mobility when compared to the left  She states she has normal sensation in the right foot and is able to range the right foot with plantar and dorsiflexion without much difficulty  However attempted movements at the knee and hip are painful in her right hip  She denies any other injuries from today's event  She states she is not on anticoagulation and has no bleeding disorders that are known  She did not fall or have any other concern for trauma today  She states she is otherwise in her baseline state of health  Prior to Admission Medications   Prescriptions Last Dose Informant Patient Reported? Taking?    Lactobacillus (PROBIOTIC ACIDOPHILUS) CAPS   Yes No   Sig: Take 1 capsule by mouth daily    Pirfenidone (ESBRIET) 267 MG TABS   Yes No   Sig: Take 2 tablets by mouth 3 (three) times a day    alendronate (FOSAMAX) 70 mg tablet   Yes No   Sig: Take 70 mg by mouth every 7 days saturday   folic acid (FOLVITE) 1 mg tablet   Yes No   Sig: Take 1,000 mcg by mouth daily   methotrexate 2 5 mg tablet   Yes No   Sig: Take 10 mg by mouth once a week sunday   omeprazole (PriLOSEC) 20 mg delayed release capsule   Yes No   Sig: Take 20 mg by mouth daily   oxyCODONE-acetaminophen (PERCOCET) 5-325 mg per tablet   No No   Sig: Take 1 tablet by mouth every 4 (four) hours as needed for moderate pain for up to 20 dosesMax Daily Amount: 6 tablets   Patient not taking: Reported on 12/23/2021       Facility-Administered Medications: None       Past Medical History:   Diagnosis Date   • Fibromyalgia, primary    • GERD (gastroesophageal reflux disease)    • Kidney stone    • Left leg DVT (HCC)    • OA (osteoarthritis)    • Plantar fascial fibromatosis of left foot    • Pulmonary emboli (HCC)    • Pulmonary fibrosis (HCC)        Past Surgical History:   Procedure Laterality Date   • CARPAL TUNNEL RELEASE Left    • CATARACT EXTRACTION     • COLONOSCOPY     • JOINT REPLACEMENT Bilateral     hips   • NASAL SEPTUM SURGERY     • ND CYSTO BLADDER W/URETERAL CATHETERIZATION Left 8/9/2019    Procedure: CYSTOSCOPY WITH INSERTION STENT URETERAL;  Surgeon: Ezekiel Martinez MD;  [facility];  Service: Urology   • ND CYSTO/URETERO W/LITHOTRIPSY &INDWELL STENT INSRT Left 9/4/2019    Procedure: CYSTOSCOPY URETEROSCOPY WITH LITHOTRIPSY HOLMIUM LASER, AND STENT URETERAL EXCHANGE;  Surgeon: Ezekiel Martinez MD;  [facility];  Service: Urology   • ND ENDOSCOPIC PLANTAR FASCIOTOMY Left 7/23/2020    Procedure: E P F ;  Surgeon: Johnny Turpin DPM;  [facility];  Service: Podiatry   • ND EXC TUMOR SOFT TISSUE FOOT/TOE SUBFASC <1 5CM Left 6/10/2021    Procedure: PLANTAR FIBROMA EXCISION;  Surgeon: Johnny Turpin DPM;  [facility];  Service: Podiatry       History reviewed  No pertinent family history  I have reviewed and agree with the history as documented      E-Cigarette/Vaping   • E-Cigarette Use Never User      E-Cigarette/Vaping Substances   • Nicotine No    • THC No    • CBD No    • Flavoring No    • Other No    • Unknown No      Social History     Tobacco Use   • Smoking status: Never   • Smokeless tobacco: Never   Vaping Use   • Vaping Use: Never used   Substance Use Topics   • Alcohol use: Not Currently   • Drug use: Never        Review of Systems   Constitutional: Negative for chills and fever  HENT: Negative for ear pain and sore throat  Eyes: Negative for pain and visual disturbance  Respiratory: Negative for cough and shortness of breath  Cardiovascular: Negative for chest pain and palpitations  Gastrointestinal: Negative for abdominal pain and vomiting  Genitourinary: Negative for dysuria and hematuria  Musculoskeletal: Positive for arthralgias and gait problem  Negative for back pain  Skin: Negative for color change and rash  Neurological: Negative for seizures and syncope  All other systems reviewed and are negative  Physical Exam  ED Triage Vitals   Temperature Pulse Respirations Blood Pressure SpO2   02/24/23 2004 02/24/23 1924 02/24/23 1924 02/24/23 1924 02/24/23 1924   98 4 °F (36 9 °C) 91 20 162/75 96 %      Temp Source Heart Rate Source Patient Position - Orthostatic VS BP Location FiO2 (%)   02/24/23 2004 02/24/23 1924 02/24/23 1924 02/24/23 1924 --   Oral Monitor Lying Left arm       Pain Score       02/24/23 1924       No Pain             Orthostatic Vital Signs  Vitals:    02/24/23 1924   BP: 162/75   Pulse: 91   Patient Position - Orthostatic VS: Lying       Physical Exam  Vitals and nursing note reviewed  Constitutional:       General: She is not in acute distress  Appearance: Normal appearance  She is well-developed  HENT:      Head: Normocephalic and atraumatic  Right Ear: External ear normal       Left Ear: External ear normal       Nose: Nose normal  No congestion or rhinorrhea  Mouth/Throat:      Mouth: Mucous membranes are moist       Pharynx: Oropharynx is clear  No oropharyngeal exudate or posterior oropharyngeal erythema  Eyes:      General: No scleral icterus  Extraocular Movements: Extraocular movements intact  Conjunctiva/sclera: Conjunctivae normal       Pupils: Pupils are equal, round, and reactive to light     Cardiovascular:      Rate and Rhythm: Normal rate and regular rhythm  Pulses: Normal pulses  Heart sounds: Normal heart sounds  No murmur heard  Pulmonary:      Effort: Pulmonary effort is normal  No respiratory distress  Breath sounds: Normal breath sounds  No wheezing or rhonchi  Abdominal:      General: Abdomen is flat  There is no distension  Palpations: Abdomen is soft  Tenderness: There is no abdominal tenderness  There is no guarding  Musculoskeletal:         General: Tenderness, deformity and signs of injury present  No swelling  Cervical back: Neck supple  No rigidity  Right hip: Deformity, tenderness and bony tenderness present  Decreased range of motion  Right lower leg: No edema  Left lower leg: No edema  Lymphadenopathy:      Cervical: No cervical adenopathy  Skin:     General: Skin is warm and dry  Capillary Refill: Capillary refill takes less than 2 seconds  Coloration: Skin is not jaundiced  Findings: No rash  Neurological:      General: No focal deficit present  Mental Status: She is alert and oriented to person, place, and time  Mental status is at baseline     Psychiatric:         Mood and Affect: Mood normal          Behavior: Behavior normal          ED Medications  Medications   sodium chloride 0 9 % bolus 1,000 mL (1,000 mL Intravenous New Bag 2/24/23 2005)   propofol (DIPRIVAN) 200 MG/20ML bolus injection 100 mg (has no administration in time range)   fentanyl citrate (PF) (FOR EMS ONLY) 100 mcg/2 mL injection 100 mcg (0 mcg Does not apply Given to EMS 2/1955)   ketamine (FOR EMS ONLY) (KETALAR) 100 mg/mL 500 mg (0 mg Does not apply Given to EMS 2/24/23 1949)   ondansetron [facility]) injection 4 mg (4 mg Intravenous Given 2/24/23 2005)       Diagnostic Studies  Results Reviewed     None                 XR hip/pelv 2-3 vws right if performed    (Results Pending)         Procedures  Pre-Procedural Sedation  Performed by: Nahum Celis MD  Authorized by: Kim Vines Jayne Sanches MD     Consent:     Consent obtained:  Written    Consent given by:  Patient    Risks discussed: Allergic reaction, dysrhythmia, inadequate sedation, nausea, prolonged hypoxia resulting in organ damage, prolonged sedation necessitating reversal, respiratory compromise necessitating ventilatory assistance and intubation and vomiting    Alternatives discussed:  Analgesia without sedation  Universal protocol:     Procedure explained and questions answered to patient or proxy's satisfaction: yes      Relevant documents present and verified: yes      Site/side marked: yes      Immediately prior to procedure a time out was called: yes      Patient identity confirmation method:  Verbally with patient and arm band  Indications:     Sedation purpose:  Dislocation reduction    Procedure necessitating sedation performed by:  Physician performing sedation    Intended level of sedation:  Moderate (conscious sedation)  Pre-sedation assessment:     Time since last food or drink:  >3hrs    ASA classification: class 3 - patient with severe systemic disease      Neck mobility: normal      Mouth openin finger widths    Mallampati score:  II - soft palate, uvula, fauces visible    Pre-sedation assessments completed and reviewed: airway patency, cardiovascular function, hydration status, mental status, nausea/vomiting, pain level, respiratory function and temperature      Pre-sedation assessment completed:  2023 8:07 PM  Procedural Sedation    Date/Time: 2023 8:07 PM  Performed by: Scott Orozco MD  Authorized by: Scott Orozco MD             ED Course               Identification of Seniors at [address] [place] [address] Most Recent Value   (ISAR) Identification of Seniors at Risk    Before the illness or injury that brought you to the Emergency, did you need someone to help you on a regular basis?  0 Filed at: 2023 1926   In the last 24 hours, have you needed more help than usual? 0 Filed at: 2023 1926   Have you been hospitalized for one or more nights during the past 6 months? 0 Filed at: 02/24/2023 1926   In general, do you see well? 0 Filed at: 02/24/2023 1926   In general, do you have serious problems with your memory? 0 Filed at: 02/24/2023 1926   Do you take more than three different medications every day? 1 Filed at: 02/24/2023 1926   ISAR Score 1 Filed at: 02/24/2023 1926                              Medical Decision Making  Amount and/or Complexity of Data Reviewed  Radiology: ordered  Risk  Prescription drug management  Disposition  Final diagnoses:   None     ED Disposition     None      Follow-up Information    None         Patient's Medications   Discharge Prescriptions    No medications on file     No discharge procedures on file  PDMP Review     None           ED Provider  Attending physically available and evaluated Vic Maria  I managed the patient along with the ED Attending      Electronically Signed by traction and abduction  Reduction method:  Allis maneuver, external rotation, traction and counter traction and abduction  Reduction method:  Allis maneuver, external rotation, traction and counter traction and abduction  Reduction successful?: Yes    Confirmation: Reduction confirmed by x-ray    Immobilization:  Other (comment) (Abduction pillow)  Neurovascular status: Neurovascularly intact    Distal perfusion: normal    Neurological function: normal    Range of motion: improved    Patient tolerance:  Patient tolerated the procedure well with no immediate complications   Patient required to pull attempts at reduction with axial traction with modified Allis maneuver, able to be reduced after approximately 4 attempts  Conscious Sedation Assessment    Flowsheet Row Classification Score   ASA Scale Assessment 2-Mild to moderate systemic disease, medically well controlled, with no functional limitation filed at 02/24/2023 2045          ED Course               Identification of Seniors at [address] [place] [address] Most Recent Value   (ISAR) Identification of Seniors at Risk    Before the illness or injury that brought you to the Emergency, did you need someone to help you on a regular basis? 0 Filed at: 02/24/2023 1926   In the last 24 hours, have you needed more help than usual? 0 Filed at: 02/24/2023 5963   Have you been hospitalized for one or more nights during the past 6 months? 0 Filed at: 02/24/2023 1926   In general, do you see well? 0 Filed at: 02/24/2023 1926   In general, do you have serious problems with your memory? 0 Filed at: 02/24/2023 1926   Do you take more than three different medications every day? 1 Filed at: 02/24/2023 1926   ISAR Score 1 Filed at: 02/24/2023 20 [facility] Making  51-year-old female is presenting with right hip dislocation    Confirmed by x-ray that there is a posterior right hip dislocation as was suspected from her history on upon arrival   No fracture on prereduction film  Will reduce using conscious sedation and axial traction  Please see separate procedure notes for procedural sedation and for orthopedic reduction  Patient was reduced successfully, tolerated procedural sedation well  Patient was given orthopedic surgery follow-up, was observed ambulating, and reduction was confirmed with postreduction x-ray  Patient was given strict return precautions for worsening pain, concern for further orthopedic injury, or for any concern for repeat dislocation  Otherwise she will follow-up with orthopedic surgery and her primary care physician  She was also given return precautions for complications from her procedural sedation which could include concerns for allergic reaction or delayed shortness of breath or other severe concerns  Expressed understanding of all of these return precautions  Discharged in fair, improved condition  She was given all post reduction instructions and equipment including an abduction pillow, told to keep her feet pointed up while sleeping, told to be weightbearing only with further weight, and to not let her leg and cross midline  She understands that there is high risk for repeat dislocation especially in the short-term after this reduction  Hip dislocation, right [facility]): acute illness or injury  Posterior dislocation of right hip [facility]): self-limited or minor problem  Right hip pain: acute illness or injury  Amount and/or Complexity of Data Reviewed  Radiology: ordered and independent interpretation performed  Risk  Prescription drug management              Disposition  Final diagnoses:   Right hip pain   Hip dislocation, right ([address] )   Posterior dislocation of right hip ([facility] [address] )     Time reflects when diagnosis was documented in both MDM as applicable and the Disposition within this note     Time User Action Codes Description Comment    2/24/2023 10:22 PM Ingrid Gary Add [T14 830] Right hip pain 2/24/2023 10:22 PM Ferchosalma Roberts Add [T47 215B] Hip dislocation, right ([address] )     2/24/2023 10:22 PM Ferchosalma Roberts Add [D44 830T] Posterior dislocation of right hip [facility])       ED Disposition     ED Disposition   Discharge    Condition   Stable    Date/Time   Fri Feb 24, 2023 10:22 PM    Comment   Don Sas discharge to home/self care                 Follow-up Information     Follow up With Specialties Details Why Contact Info Additional Information    Franco Balderrama MD Family Medicine   [address]  [address] [facility] [phone]       [address] [place] [address] Emergency Department Emergency Medicine Go to  If symptoms worsen, As needed [address] [phone]  [address] [facility] 64 [facility] Emergency Department, [address], [address] [facility]    30 [facility] Orthopedic Surgery   [address] [id number] [phone] 30 [facility], [address] [place], [place], [address]  [facility]  Use Entrance A           Discharge Medication List as of 2/24/2023 10:45 PM      CONTINUE these medications which have NOT CHANGED    Details   alendronate (FOSAMAX) 70 mg tablet Take 70 mg by mouth every 7 days saturday, Historical Med      folic acid (FOLVITE) 1 mg tablet Take 1,000 mcg by mouth daily, Starting Tue 4/14/2020, Historical Med      Lactobacillus (PROBIOTIC ACIDOPHILUS) CAPS Take 1 capsule by mouth daily , Historical Med      methotrexate 2 5 mg tablet Take 10 mg by mouth once a week sunday, Historical Med      omeprazole (PriLOSEC) 20 mg delayed release capsule Take 20 mg by mouth daily, Historical Med      oxyCODONE-acetaminophen (PERCOCET) 5-325 mg per tablet Take 1 tablet by mouth every 4 (four) hours as needed for moderate pain for up to 20 dosesMax Daily Amount: 6 tablets, Starting Thu 7/23/2020, Normal Pirfenidone (ESBRIET) 267 MG TABS Take 2 tablets by mouth 3 (three) times a day , Historical Med               PDMP Review     None           ED Provider  Attending physically available and evaluated Arnel Yan I managed the patient along with the ED Attending      Electronically Signed by         Gabby Veliz MD  03/02/23 8804

## 2023-02-28 ENCOUNTER — TELEPHONE (OUTPATIENT)
Dept: OBGYN CLINIC | Facility: CLINIC | Age: 68
End: 2023-02-28

## 2023-02-28 NOTE — TELEPHONE ENCOUNTER
Hello,  Please advise if the following patient can be forced onto the schedule:    Patient: Elsa Orellana     : 1955    MRN: [id number]     Call back #: [phone]    Insurance: Medicare     Reason for appointment: Right hip dislocation // doi: 23 // Saw Anthony Alicea in  for same thing     Requested doctor/location: Per triage nurse ok to see Anthony Alicea 3/8/23 in [place]       Thank you      E-mail sent to Anthony Alicea

## 2023-03-06 ENCOUNTER — TELEPHONE (OUTPATIENT)
Dept: OBGYN CLINIC | Facility: HOSPITAL | Age: 68
End: 2023-03-06

## 2023-03-06 DIAGNOSIS — S73.014A POSTERIOR DISLOCATION OF RIGHT HIP, INITIAL ENCOUNTER (HCC): Primary | ICD-10-CM

## 2023-03-06 NOTE — TELEPHONE ENCOUNTER
Caller: patient  Doctor: Hunter Sue    Reason for call: patient scheduled 3/29, should she continue using the pillow until she's seen again?     Call back#: [phone]

## 2023-03-06 NOTE — TELEPHONE ENCOUNTER
Called and spoke w/pt and informed that she should continue to use pillow  She works in a   Should she stay out of work until she see Dr Rob Patel? She will need a work note/excuse  If she goes back to work, are there restrictions? Please advise

## 2023-03-07 NOTE — TELEPHONE ENCOUNTER
Called and spoke w/pt and she is not at her baseline as she still need assistance, even to go to the bathroom  Would you please write her a note until her appt on 3/29 and then can be re-evaluated at that time? Thank you  Advised pt that letter should be found in [facility] when done

## 2023-03-29 ENCOUNTER — HOSPITAL ENCOUNTER (OUTPATIENT)
Dept: RADIOLOGY | Facility: HOSPITAL | Age: 68
Discharge: HOME | End: 2023-03-29
Attending: ORTHOPAEDIC SURGERY

## 2023-03-29 ENCOUNTER — OFFICE VISIT (OUTPATIENT)
Dept: OBGYN CLINIC | Facility: HOSPITAL | Age: 68
End: 2023-03-29

## 2023-03-29 VITALS
DIASTOLIC BLOOD PRESSURE: 79 MMHG | WEIGHT: 126.4 LBS | HEIGHT: 63 IN | BODY MASS INDEX: 22.39 KG/M2 | HEART RATE: 99 BPM | SYSTOLIC BLOOD PRESSURE: 149 MMHG

## 2023-03-29 DIAGNOSIS — S73.014A: ICD-10-CM

## 2023-03-29 DIAGNOSIS — S73.014A POSTERIOR DISLOCATION OF RIGHT HIP, INITIAL ENCOUNTER (HCC): ICD-10-CM

## 2023-03-29 NOTE — PATIENT INSTRUCTIONS
- Weight bearing as tolerated   - Begin physical therapy as directed   - Over the counter analgesics as needed / directed   - Ice / heat as directed   - Discussed the possibility of surgical intervention in the future pending symptoms    - Revision RICHELLE   - Follow up PRN

## 2023-03-29 NOTE — LETTER
March 29, 2023     Patient: Katelynn Alcaraz  YOB: 1955  Date of Visit: 3/29/2023      To Whom it May Concern:    Katelynn Alcaraz is under my professional care  Shahzad Nelson was seen in my office on 3/29/2023  Shahzad Nelson may return to work on 4/3/23  If you have any questions or concerns, please don't hesitate to call           Sincerely,          Spring Brock MD

## 2023-03-29 NOTE — PROGRESS NOTES
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  [facility]) by Dr Nikita Hahn  Minimal symptoms currently       Plan:   - Weight bearing as tolerated   - Begin physical therapy as directed   - Over the counter analgesics as needed / directed   - Ice / heat as directed   - Discussed the possibility of surgical intervention in the future pending symptoms    - Revision RICHELLE   - Follow up PRN       To Do Next Visit:  PRN     _____________________________________________________  CHIEF COMPLAINT:  Chief Complaint   Patient presents with   • Right Hip - Follow-up         SUBJECTIVE:  Elvis Farias is a 76 y o  female who presents to the to the office for a follow-up evaluation of her right hip  Patient has past surgical history of right hip replacement with prosthetic joint in 2014 [facility]) by Dr Nikita Hahn  Patient states that she sustained a right hip dislocation on 12/10/2021 resulting in her having her right hip relocated in the ER  Patient states that her hip was doing well until 2/24/2023 when she again had her right hip dislocate  Patient again went to the ER where the hip was reduced and was advised to follow-up with orthopedics for further evaluation  Patient states that prior to her most recent dislocation she was seen and evaluated by Dr Nikita Hahn who advised the patient that there was nothing wrong in the hip according to the patient  Patient states that now she has mild discomfort in the hip with no severe pain  Patient is fearful that she may dislocate her hip again with any wrong movements  Patient states that she takes ibuprofen as needed for pain  Denies any numbness or tingling in her legs  Patient has not followed up with Dr Nikita Hahn since her most recent dislocation  Patient offers no other complaints at this time        PAST MEDICAL HISTORY:  Past Medical History:   Diagnosis Date   • Fibromyalgia, primary    • GERD (gastroesophageal reflux disease)    • Kidney stone    • Left leg DVT (HCC)    • OA (osteoarthritis)    • Plantar fascial fibromatosis of left foot    • Pulmonary emboli (HCC)    • Pulmonary fibrosis (HCC)        PAST SURGICAL HISTORY:  Past Surgical History:   Procedure Laterality Date   • CARPAL TUNNEL RELEASE Left    • CATARACT EXTRACTION     • COLONOSCOPY     • JOINT REPLACEMENT Bilateral     hips   • NASAL SEPTUM SURGERY     • MO CYSTO BLADDER W/URETERAL CATHETERIZATION Left 8/9/2019    Procedure: CYSTOSCOPY WITH INSERTION STENT URETERAL;  Surgeon: Gunner Zarate MD;  [facility];  Service: Urology   • MO CYSTO/URETERO W/LITHOTRIPSY &INDWELL STENT INSRT Left 9/4/2019    Procedure: CYSTOSCOPY URETEROSCOPY WITH LITHOTRIPSY HOLMIUM LASER, AND STENT URETERAL EXCHANGE;  Surgeon: Gunner Zarate MD;  Location: [facility];  Service: Urology   • MO ENDOSCOPIC PLANTAR FASCIOTOMY Left 7/23/2020    Procedure: E P F ;  Surgeon: Rcahael Enciso DPM;  Location: [facility];  Service: Podiatry   • MO EXC TUMOR SOFT TISSUE FOOT/TOE SUBFASC <1 5CM Left 6/10/2021    Procedure: PLANTAR FIBROMA EXCISION;  Surgeon: Rachael Enciso DPM;  Location[facility];  Service: Podiatry       FAMILY HISTORY:  History reviewed  No pertinent family history      SOCIAL HISTORY:  Social History     Tobacco Use   • Smoking status: Never   • Smokeless tobacco: Never   Vaping Use   • Vaping Use: Never used   Substance Use Topics   • Alcohol use: Not Currently   • Drug use: Never       MEDICATIONS:    Current Outpatient Medications:   •  alendronate (FOSAMAX) 70 mg tablet, Take 70 mg by mouth every 7 days saturday, Disp: , Rfl:   •  folic acid (FOLVITE) 1 mg tablet, Take 1,000 mcg by mouth daily, Disp: , Rfl:   •  Lactobacillus (PROBIOTIC ACIDOPHILUS) CAPS, Take 1 capsule by mouth daily , Disp: , Rfl:   •  methotrexate 2 5 mg tablet, Take 10 mg by mouth once a week sunday, Disp: , Rfl: "  •  omeprazole (PriLOSEC) 20 mg delayed release capsule, Take 20 mg by mouth daily, Disp: , Rfl:   •  Pirfenidone 267 MG TABS, Take 2 tablets by mouth 3 (three) times a day , Disp: , Rfl:   •  oxyCODONE-acetaminophen (PERCOCET) 5-325 mg per tablet, Take 1 tablet by mouth every 4 (four) hours as needed for moderate pain for up to 20 dosesMax Daily Amount: 6 tablets (Patient not taking: Reported on 12/23/2021 ), Disp: 20 tablet, Rfl: 0    ALLERGIES:  No Known Allergies    REVIEW OF SYSTEMS:  MSK: right hip pain   Neuro: WNL   Pertinent items are otherwise noted in HPI  A comprehensive review of systems was otherwise negative  LABS:  HgA1c:   Lab Results   Component Value Date    HGBA1C 5 3 05/22/2021     BMP:   Lab Results   Component Value Date    CALCIUM 8 6 05/22/2021    K 4 4 05/22/2021    CO2 25 05/22/2021     05/22/2021    BUN 12 05/22/2021    CREATININE 0 83 05/22/2021     CBC: No components found for: CBC    _____________________________________________________  PHYSICAL EXAMINATION:  Vital signs: /79   Pulse 99   Ht 5' 3\" (1 6 m)   Wt 57 3 kg (126 lb 6 4 oz)   BMI 22 39 kg/m²   General: No acute distress, awake and alert  Psychiatric: Mood and affect appear appropriate  HEENT: Trachea Midline, No torticollis, no apparent facial trauma  Cardiovascular: No audible murmurs; Extremities appear perfused  Pulmonary: No audible wheezing or stridor  Skin: No open lesions; see further details (if any) below      MUSCULOSKELETAL EXAMINATION:  Right hip examination:  - Patient sitting comfortably in the office in no acute distress   -Incision site healed with no surrounding erythema or ecchymosis present    Extremity appears well-perfused overall   -No bony or soft tissue tenderness to palpation noted at this time   -No pain with motion of the right hip  - NV intact    _____________________________________________________  STUDIES REVIEWED:  I personally reviewed the images and interpretation is as " "follows:  Right hip XR 3 views:  Status post right total hip arthroplasty with no acute osseous abnormalities present  PROCEDURES PERFORMED:  No procedures were performed at this time  Arlene Jackson PA-C - assisting  Lyndon Dailey                      Portions of the record may have been created with voice recognition software  Occasional wrong word or \"sound a like\" substitutions may have occurred due to the inherent limitations of voice recognition software  Read the chart carefully and recognize, using context, where substitutions have occurred      "

## 2023-03-30 ENCOUNTER — TELEPHONE (OUTPATIENT)
Dept: OBGYN CLINIC | Facility: HOSPITAL | Age: 68
End: 2023-03-30

## 2023-03-30 NOTE — TELEPHONE ENCOUNTER
Caller: patient    Doctor: Marla Garrett    Reason for call: pt called asking does she still need to use the pillow between her legs when laying on her back and she is also asking if she can lay on her side using the pillow    Call back#: [phone]

## 2023-04-05 ENCOUNTER — EVALUATION (OUTPATIENT)
Dept: PHYSICAL THERAPY | Facility: REHABILITATION | Age: 68
End: 2023-04-05

## 2023-04-05 DIAGNOSIS — S73.014A POSTERIOR DISLOCATION OF RIGHT HIP, INITIAL ENCOUNTER (HCC): Primary | ICD-10-CM

## 2023-04-05 DIAGNOSIS — S73.014A: ICD-10-CM

## 2023-04-05 NOTE — PROGRESS NOTES
PT Evaluation     Today's date: 2023  Patient name: Katelynn Alcaraz  : 1955  MRN: [id number]  Referring provider: Otf Hines MD  Dx:   Encounter Diagnosis     ICD-10-CM    1  Posterior dislocation of right hip, initial encounter ([facility] )  S73 014A           Start Time: 1135  Stop Time: 1210  Total time in clinic (min): 35 minutes    Assessment  Assessment details: Katelynn Alcaraz is a 76y o  year old female with a referred dx of Posterior dislocation of right hip, initial encounter ([facility] )  Pt presents with pain with functional activities such as bending and squatting  Upon further clinical testing, pt demonstrates decreased hip strength, restricted B hip ROM, and impaired balance  Pt would benefit from skilled OP PT to address these, and the below impairments in order to optimize outcomes and promote return to functional baseline  Pt able to demonstrate HEP with good technique and no pain  Educated pt to stop any exercises causing pain increase, pt verbalizes understanding  Impairments: abnormal or restricted ROM, abnormal movement, activity intolerance, impaired balance, impaired physical strength, lacks appropriate home exercise program, pain with function and poor body mechanics    Goals    Short Term Goals: In 4 weeks, the patient will:  1  Decrease worst pain by 2 points for improved QOL  2  Improve hip strength by 1/2 grade for improved hip stability  3  Supervision with HEP for self care  Long Term Goals: In 8 weeks, the patient will:  1  Improve hip strength by 1 grade for improved hip stability  2  FOTO to greater than predicted value  3  Independent with comprehensive HEP upon discharge  4  Report less pain with squat to return to PLOF         Plan  Patient would benefit from: skilled physical therapy  Referral necessary: No  Planned modality interventions: cryotherapy  Planned therapy interventions: activity modification, ADL retraining, abdominal trunk stabilization, "manual therapy, neuromuscular re-education, patient education, postural training, strengthening, stretching, therapeutic activities, therapeutic exercise, home exercise program, graded exercise, functional ROM exercises, flexibility, balance and behavior modification  Frequency: 2x week  Duration in weeks: 8  Treatment plan discussed with: patient        Subjective Evaluation    History of Present Illness  Mechanism of injury: Pt has PMH of R RICHELLE in   Pt had x2 posterior dislocations, most recent 23  Pt does have pain, described as \"discomfort\" with sitting in low chair  Pt reports she has difficulty/avoids bending over/squatting  Pt does have LBP recently, denies numbness/tingling, BB changes  Pt does have hx of L RICHELLE on 2016  Pain  Current pain rating: 3  At best pain ratin  At worst pain rating: 10  Quality: discomfort  Relieving factors: medications and ice    Social Support  Stairs in house: yes (1 flight)   Lives in: multiple-level home    Patient Goals  Patient goals for therapy: decreased pain, return to sport/leisure activities, independence with ADLs/IADLs, increased strength and increased motion          Objective         OBJECTIVE:    Standing Posture & Lower Extremity Alignment:  Structure/Joint         Pelvis (Tilt) X Anterior  Neutral  Posterior   Iliac Crests  Right Elevated X Neutral  Left Elevated   Knee - Frontal   Genuvalgum X Neutral  Genuvarum   Knee - Sagittal  Genurecurvatum X Neutral       Range of Motion: Goniometric revealed the following findings (in degrees)  Joint Motion Right: 2023 Left: 2023   Hip Flexion 110 95   Hip External Rotation 25 35   Hip Internal Rotation 25 10   [facility]   Knee Flexion [facility]     Strength: MMT revealed the following findings    Joint Motion Right: 2023 Left: 2023   Hip Flexion 4-/5 4-/5   Hip Abduction 3/5 3/5   Hip ER 3+/5 3+/5   Hip IR 4-/5 4-/5   Hip Extension 4-/5 4-/5   Knee Extension 5/5 5/5   Knee Flexion " "4/5 4/5   Ankle Plantarflexion 5/5 5/5   Ankle Dorsiflexion 5/5 5/5     Additional Assessments:  Palpation: no TTP globally around R hip  Observation: no visible deformity  Gait Pattern: dec stance on RLE  Balance: dec SLS on RLE   Joint Mobility: hypomobility secondary to post-op  Squat: dec depth, reports pain    L/s Screen:   ROM: 50% limited flexion secondary to \"they told me not to bend past 90 deg\"      Lower quarter screen: unremarkable        Special Tests:  Test (Structure evaluated) Date: 4/5/2023  ( P / N )   Serjio (Iliapsoas/Rectus Fem) + POS   John (ITB) neg   90/90 SLR (Hamstring) + POS   Ely (Rectus Femoris) neg       Outcome Measures: FOTO: 49          Pertinent Findings and Outcome Measures:                                                                                                                                                                         Test / Measure  4/5/2023      FOTO 49      Hip abd MMT 3+/5      Hip ER MMT 3+/5      Hip flex MMT 4-/5          Precautions: PMH of R posterior hip dislocation, Fibromyalgia, B hip RICHELLE (R 2014, L 2016), hx of DVT    HEP access code: [email or web address]      Manuals 4/5                                  Neuro Re-Ed       Mini squats       Paloff press w/ side step       Side plank on knees       SLS       DL's                     Ther Ex       NS/upright bike       Bridge x10  x10+ alt march hep On pball     w/ #      SL clamshell x10 R hep      Stand hip abd x10 B hep tband NV     SL hip abd  (pain)     SLR x10 R hep      Leg press       HL adduct   ball squeeze       [address] w/ #       Ther Activity       Side stepping       FSU       LSU       STS's       Gait Training                     Modalities                        "

## 2023-04-25 ENCOUNTER — OFFICE VISIT (OUTPATIENT)
Dept: PHYSICAL THERAPY | Facility: REHABILITATION | Age: 68
End: 2023-04-25

## 2023-04-25 DIAGNOSIS — S73.014A POSTERIOR DISLOCATION OF RIGHT HIP, INITIAL ENCOUNTER (HCC): Primary | ICD-10-CM

## 2023-04-25 NOTE — PROGRESS NOTES
"Daily Note     Today's date: 2023  Patient name: Ben Waldrop  : 1955  MRN: [id number]  Referring provider: Ene Koch MD  Dx:   Encounter Diagnosis     ICD-10-CM    1  Posterior dislocation of right hip, initial encounter ([facility] )  S73 014A           Start Time: 1042  Stop Time: 1120  Total time in clinic (min): 38 minutes    Subjective: Pt reports she was sore after last tx session in quads  Objective: See treatment diary below      Assessment: Tolerated treatment well  Pt able to progress several strengthening exercises today with good tolerance  VC needed for hip hinge to increase activation of posterior chain  Pt notes fatigue post-tx, no pain increase  Patient would benefit from continued PT  1:1 with Autumn Hudson DPT for entirety of tx  Plan: Continue per plan of care        Pertinent Findings and Outcome Measures:                                                                                                                                                                         Test / Measure  2023      FOTO 49      Hip abd MMT 3+/5      Hip ER MMT 3+/5      Hip flex MMT 4-/5          Precautions: PMH of R posterior hip dislocation, Fibromyalgia, B hip RICHELLE (R , L ), hx of DVT    HEP access code: [email or web address]      Manuals                                        Neuro Re-Ed         Mini squats  x10       Paloff press w/ side step    (x4 steps out), x3 laps B, gtb and 5#; hold NV 2x5 laps b/l dbl btb    Side plank on knees         SLS  30\"x3 30\"x3 on foam 30\"x3 R on foam 3x30\" R on foam SLS on foam, 2x10 B gtb hip abd   DL's    NV 2x10 20# limited ROM 3x8 15# rack pull                      Ther Ex         NS/upright bike  NS 10' L5 NS 10' L5 NS 10' L5 10' L5 NS 10' L5 NS   Bridge x10  x10+ alt march hep 2x10 on pball  2x10 on pball  2x10 on pball   2x8 R SL   SL clamshell x10 R hep 2x10 otb 2x10 R gtb 2x10 R gtb 2x10 R gtb 3x10 R gtb " "  Stand march   2x10 4#      Stand hip abd x10 B hep otb hep       SL hip abd  (pain) 2x10 R      SLR x10 R hep   2x10 R 2x10 R 2x10 R 1#, 2# NV   Leg press         HL adduct   ball squeeze  5\" 2x10 5\" 2x10      [address] w/ #  3x10 R 3# 2x10 R 4# 2x10 R 5# 2x15 R 5# dc   [place] hip 3-way   x10 B ext 5#      HS stretch, strap    30\"x2 B np    Ther Activity         Side stepping  x3 laps otb  x2 laps gtb  [employer] walks x2 laps gtb; d/c NV     FSU   2x10 R 6\" step 2x10 R 6\" step 2x10 R 6\"  2x10 R 9\"   LSU   2x10 R 6\" step 2x10 R 6\" step 2x10 R 6\" 2x10 R 9\"   STS's  2x10 chair +foam, otb at knees held      Gait Training                           Modalities                                "

## 2023-04-28 ENCOUNTER — OFFICE VISIT (OUTPATIENT)
Dept: PHYSICAL THERAPY | Facility: REHABILITATION | Age: 68
End: 2023-04-28

## 2023-04-28 DIAGNOSIS — S73.014A POSTERIOR DISLOCATION OF RIGHT HIP, INITIAL ENCOUNTER (HCC): Primary | ICD-10-CM

## 2023-04-28 NOTE — PROGRESS NOTES
"Daily Note     Today's date: 2023  Patient name: Jewels Kirkpatrick  : 1955  MRN: [id number]  Referring provider: Karen Marsh MD  Dx:   Encounter Diagnosis     ICD-10-CM    1  Posterior dislocation of right hip, initial encounter ([facility] )  S73 014F                      Subjective: Pt reports some soreness from increased exercise last time, feels good overall  Objective: See treatment diary below      Assessment: Tolerated treatment well  Patient would benefit from continued PT   Pt  able to complete all exercises with no increase in pain during or after session  Pt able to progress some exercises this session without complaint  Pt 1:1 with PTA [id number], IEP for remainder  Plan: Continue per plan of care  Pertinent Findings and Outcome Measures:                                                                                                                                                                         Test / Measure  2023      FOTO 52      Hip abd MMT 3+/5      Hip ER MMT 3+/5      Hip flex MMT 4-/5          Precautions: PMH of R posterior hip dislocation, Fibromyalgia, B hip RICHELLE (R , L ), hx of DVT    HEP access code: [email or web address]      Manuals                                Neuro Re-Ed       Mini squats       Paloff press w/ side step (x4 steps out), x3 laps B, gtb and 5#; hold NV 2x5 laps b/l dbl btb     Side plank on knees       SLS 30\"x3 R on foam 3x30\" R on foam SLS on foam, 2x10 B gtb hip abd SLS on airex 2x10 b/l gtb ABD   DL's NV 2x10 20# limited ROM 3x8 15# rack pull  3x10 15# kb                 Ther Ex       NS/upright bike NS 10' L5 10' L5 NS 10' L5 NS 10' L5 NS   Bridge 2x10 on pball   2x8 R SL 2x10 R SL   SL clamshell 2x10 R gtb 2x10 R gtb 3x10 R gtb 3x10 R gtb   Stand march       Stand hip abd       SL hip abd       SLR 2x10 R 2x10 R 2x10 R 1#, 2# NV 2x10 R 2#   Leg press       HL adduct   ball squeeze       [address] w/ # 2x10 R 5# 2x15 R 5# " "dc    [place] hip 3-way       HS stretch, strap 30\"x2 B np     Ther Activity       Side stepping Monster walks x2 laps gtb; d/c NV      FSU 2x10 R 6\" step 2x10 R 6\"  2x10 R 9\" 2x10 R 9\"   LSU 2x10 R 6\" step 2x10 R 6\" 2x10 R 9\" 2x10 R 9\"   STS's       Gait Training                     Modalities                          "

## 2023-05-01 ENCOUNTER — OFFICE VISIT (OUTPATIENT)
Dept: PHYSICAL THERAPY | Facility: REHABILITATION | Age: 68
End: 2023-05-01

## 2023-05-01 DIAGNOSIS — S73.014A POSTERIOR DISLOCATION OF RIGHT HIP, INITIAL ENCOUNTER (HCC): Primary | ICD-10-CM

## 2023-05-01 NOTE — PROGRESS NOTES
"Daily Note     Today's date: 2023  Patient name: Onesimo Young  : 1955  MRN: [id number]  Referring provider: Jorge L Torres MD  Dx:   Encounter Diagnosis     ICD-10-CM    1  Posterior dislocation of right hip, initial encounter ([facility] )  S73 014A           Start Time: 97  Stop Time: 74  Total time in clinic (min): 53 minutes    Subjective: Pt reports slight pain increase since yesterday, unsure if this is caused from rainy weather  Objective: See treatment diary below      Assessment: Tolerated treatment well  Pt continues to progress functional strength well overall  Pt reports fatigue post-tx, no pain  Continue to progress as tolerated  Patient would benefit from continued PT   1:1 with Taiwo Rowan DPT for entirety of tx  Plan: Continue per plan of care        Pertinent Findings and Outcome Measures:                                                                                                                                                                         Test / Measure  2023      FOTO 52      Hip abd MMT 3+/5      Hip ER MMT 3+/5      Hip flex MMT 4-/5          Precautions: PMH of R posterior hip dislocation, Fibromyalgia, B hip RICHELLE (R , L ), hx of DVT    HEP access code: [facility]      Manuals                                    Neuro Re-Ed        Mini squats        Paloff press w/ side step (x4 steps out), x3 laps B, gtb and 5#; hold NV 2x5 laps b/l dbl btb      Side plank on knees        SLS 30\"x3 R on foam 3x30\" R on foam SLS on foam, 2x10 B gtb hip abd SLS on airex 2x10 b/l gtb ABD SLS on foam, 2x10 B gtb hip abd   DL's NV 2x10 20# limited ROM 3x8 15# rack pull  3x10 15# kb 3x10 20# KB                   Ther Ex        NS/upright bike NS 10' L5 10' L5 NS 10' L5 NS 10' L5 NS 10' L5 NS   Bridge 2x10 on pball   2x8 R SL 2x10 R SL 2x10 R SL   SL clamshell 2x10 R gtb 2x10 R gtb 3x10 R gtb 3x10 R gtb np   Stand march        Stand hip " "abd        SL hip abd        SLR 2x10 R 2x10 R 2x10 R 1#, 2# NV 2x10 R 2# 2x10 R 2#   Leg press     NV   HL adduct   ball squeeze        [address] w/ # 2x10 R 5# 2x15 R 5# dc     [place] hip 3-way        HS stretch, strap 30\"x2 B np      Ther Activity        Side stepping Monster walks x2 laps gtb; d/c NV       FSU 2x10 R 6\" step 2x10 R 6\"  2x10 R 9\" 2x10 R 9\" 2x10 R 9\" 5#   LSU 2x10 R 6\" step 2x10 R 6\" 2x10 R 9\" 2x10 R 9\" 2x10 R 9\" 5#   STS's        Gait Training                        Modalities                             "

## 2023-05-04 ENCOUNTER — OFFICE VISIT (OUTPATIENT)
Dept: PHYSICAL THERAPY | Facility: REHABILITATION | Age: 68
End: 2023-05-04

## 2023-05-04 DIAGNOSIS — M25.551 RIGHT HIP PAIN: Primary | ICD-10-CM

## 2023-05-04 DIAGNOSIS — S73.014A POSTERIOR DISLOCATION OF RIGHT HIP, INITIAL ENCOUNTER (HCC): ICD-10-CM

## 2023-05-04 NOTE — PROGRESS NOTES
"Daily Note     Today's date: 2023  Patient name: Alley Muro  : 1955  MRN: [id number]  Referring provider: Becky Collado MD  Dx:   Encounter Diagnosis     ICD-10-CM    1  Right hip pain  M25 [id number]       2  Posterior dislocation of right hip, initial encounter ([facility] )  S73 014A           Start Time: 1100  Stop Time: 1145  Total time in clinic (min): 45 minutes    Subjective: Reports wanted to avoid UE usage today, hurt hand recently  Objective: See treatment diary below      Assessment: Tolerated treatment well  Patient would benefit from continued PT 1:1 with Brain Patella, DPT for 30mins of tx  Pt tolerated tx well, modified to avoid UE usage  Continue per POC  Plan: Continue per plan of care        Pertinent Findings and Outcome Measures:                                                                                                                                                                         Test / Measure  2023      FOTO 52      Hip abd MMT 3+/5      Hip ER MMT 3+/5      Hip flex MMT 4-/5          Precautions: PMH of R posterior hip dislocation, Fibromyalgia, B hip RICHELLE (R , L ), hx of DVT    HEP access code: [facility]      Manuals                                        Neuro Re-Ed         Mini squats         Paloff press w/ side step (x4 steps out), x3 laps B, gtb and 5#; hold NV 2x5 laps b/l dbl btb       Side plank on knees         SLS 30\"x3 R on foam 3x30\" R on foam SLS on foam, 2x10 B gtb hip abd SLS on airex 2x10 b/l gtb ABD SLS on foam, 2x10 B gtb hip abd    DL's NV 2x10 20# limited ROM 3x8 15# rack pull  3x10 15# kb 3x10 20# KB                      Ther Ex         NS/upright bike NS 10' L5 10' L5 NS 10' L5 NS 10' L5 NS 10' L5 NS 10' L5 NS LE Only   Bridge 2x10 on pball   2x8 R SL 2x10 R SL 2x10 R SL 3x10 R SL   SL clamshell 2x10 R gtb 2x10 R gtb 3x10 R gtb 3x10 R gtb np    Stand march         Stand hip abd         SL hip abd       " "  SLR 2x10 R 2x10 R 2x10 R 1#, 2# NV 2x10 R 2# 2x10 R 2# 2x10 R 2#   Leg press     NV    HL adduct   ball squeeze         [address] w/ # 2x10 R 5# 2x15 R 5# dc      [place] hip 3-way         HS stretch, strap 30\"x2 B np       Ther Activity         Side stepping Monster walks x2 laps gtb; d/c NV     Lat band walk gtb x30   FSU 2x10 R 6\" step 2x10 R 6\"  2x10 R 9\" 2x10 R 9\" 2x10 R 9\" 5# 2x10 R 9\" 5#   LSU 2x10 R 6\" step 2x10 R 6\" 2x10 R 9\" 2x10 R 9\" 2x10 R 9\" 5# 2x10 R 9\" 5#   STS's         Gait Training                           Modalities                                "

## 2023-05-10 ENCOUNTER — EVALUATION (OUTPATIENT)
Dept: PHYSICAL THERAPY | Facility: REHABILITATION | Age: 68
End: 2023-05-10

## 2023-05-10 DIAGNOSIS — M25.551 RIGHT HIP PAIN: Primary | ICD-10-CM

## 2023-05-10 DIAGNOSIS — S73.014A POSTERIOR DISLOCATION OF RIGHT HIP, INITIAL ENCOUNTER (HCC): ICD-10-CM

## 2023-05-10 NOTE — PROGRESS NOTES
"PT Re-Evaluation    Today's date: 5/10/2023  Patient name: Mariam Hatfield  : 1955  MRN: [id number]  Referring provider: Loni Escobar MD  Dx:   Encounter Diagnosis     ICD-10-CM    1  Right hip pain  M25 551       2  Posterior dislocation of right hip, initial encounter ([facility] )  S73 [id number]           Start Time: 1036  Stop Time: 1120  Total time in clinic (min): 44 minutes    Subjective: Pt reports making improvement since participating in PT  \"Since I've done the exercises, I'm walking better, I still have problems with bending down to pick something up  \" Pt reports \"tightness\" in quads with squatting down to floor  Pt reports no difficulty with stairs or getting in/out of car  Pt reports improvement in self-management with hep, would like to decrease to 1x/week to trial increased independent management with hep  Pain  Current pain rating: 3  At best pain ratin  At worst pain rating: 3      Objective: See treatment diary below    Range of Motion: Goniometric revealed the following findings (in degrees)  Joint Motion Right:  Left:    Hip Flexion 110 95   Hip External Rotation 25 35   Hip Internal Rotation 25 10   Knee Extension [facility] WFL   Knee Flexion [facility]     Strength: MMT revealed the following findings  Joint Motion Right:  Left:    Hip Flexion 4/5 4+/5   Hip Abduction 4-/5 4-/5   Hip ER 4-/5 4-/5   Hip IR 4+/5 4+/5   Hip Extension 4/5 4+/5   Knee Extension 5/5 5/5   Knee Flexion 4+/5 4+/5   Ankle Plantarflexion 5/5 5/5   Ankle Dorsiflexion 5/5 5/5         Assessment: Mariam Hatfield is a 76y o  year old female with a referred dx of Posterior dislocation of right hip, initial encounter ([facility] )  Pt has demonstrated improvements in overall LE strength and pain levels  Pt continues to be limited with tolerance to squatting and independence with HEP   Pt would continue to benefit from skilled OP PT to address these, and the below impairments in order to optimize outcomes and promote return to " functional baseline  Impairments: abnormal or restricted ROM, abnormal movement, activity intolerance, impaired balance, impaired physical strength, lacks appropriate home exercise program, pain with function and poor body mechanics    Goals  Short Term Goals: In 4 weeks, the patient will:  1  Decrease worst pain by 2 points for improved QOL  - MET  2  Improve hip strength by 1/2 grade for improved hip stability   - MET  3  Supervision with HEP for self care  - MET    Long Term Goals: In 8 weeks, the patient will:  1  Improve hip strength by 1 grade for improved hip stability  - partially met  2  FOTO to greater than predicted value  - MET  3  Independent with comprehensive HEP upon discharge  - not met  4  Report less pain with squat to return to PLOF  - not met        Plan: Continue per plan of care      Patient would benefit from: skilled physical therapy  Referral necessary: No  Planned modality interventions: cryotherapy  Planned therapy interventions: activity modification, ADL retraining, abdominal trunk stabilization, manual therapy, neuromuscular re-education, patient education, postural training, strengthening, stretching, therapeutic activities, therapeutic exercise, home exercise program, graded exercise, functional ROM exercises, flexibility, balance and behavior modification  Frequency: 1x/week  Duration in weeks: 6     Pertinent Findings and Outcome Measures:                                                                                                                                                                         Test / Measure  4/5/2023 5/10/2023     FOTO 49 met     Hip abd MMT 3+/5 4-/5     Hip ER MMT 3+/5 4-/5     Hip flex MMT 4-/5 4/5         Precautions: PMH of R posterior hip dislocation, Fibromyalgia, B hip RICHELLE (R 2014, L 2016), hx of DVT    HEP access code: [facility]      Manuals 4/19 4/21 4/25 4/28 5/1 5/4 5/10                                           Neuro Re-Ed "  Mini squats          Paloff press w/ side step (x4 steps out), x3 laps B, gtb and 5#; hold NV 2x5 laps b/l dbl btb        Side plank on knees          SLS 30\"x3 R on foam 3x30\" R on foam SLS on foam, 2x10 B gtb hip abd SLS on airex 2x10 b/l gtb ABD SLS on foam, 2x10 B gtb hip abd     DL's NV 2x10 20# limited ROM 3x8 15# rack pull  3x10 15# kb 3x10 20# KB                         Ther Ex          NS/upright bike NS 10' L5 10' L5 NS 10' L5 NS 10' L5 NS 10' L5 NS 10' L5 NS LE Only 10' L5 NS   Bridge 2x10 on pball   2x8 R SL 2x10 R SL 2x10 R SL 3x10 R SL 3x10 R SL   SL clamshell 2x10 R gtb 2x10 R gtb 3x10 R gtb 3x10 R gtb np  2x10 R    3x10 HL black tb   Stand march          Stand hip abd          SL hip abd          SLR 2x10 R 2x10 R 2x10 R 1#, 2# NV 2x10 R 2# 2x10 R 2# 2x10 R 2# np   Leg press     NV  x10 BLE 70#  2x6 R 25#   HL adduct   ball squeeze          [address] w/ # 2x10 R 5# 2x15 R 5# dc       [place] hip 3-way          HS stretch, strap 30\"x2 B np        Ther Activity          Side stepping Monster walks x2 laps gtb; d/c NV     Lat band walk gtb x30 D/c   FSU 2x10 R 6\" step 2x10 R 6\"  2x10 R 9\" 2x10 R 9\" 2x10 R 9\" 5# 2x10 R 9\" 5#    LSU 2x10 R 6\" step 2x10 R 6\" 2x10 R 9\" 2x10 R 9\" 2x10 R 9\" 5# 2x10 R 9\" 5#    STS's       2x10 chair +foam, 5#   Gait Training                              Modalities                                   "

## 2023-05-17 ENCOUNTER — APPOINTMENT (OUTPATIENT)
Dept: PHYSICAL THERAPY | Facility: REHABILITATION | Age: 68
End: 2023-05-17
Payer: COMMERCIAL

## 2023-05-24 ENCOUNTER — APPOINTMENT (OUTPATIENT)
Dept: PHYSICAL THERAPY | Facility: REHABILITATION | Age: 68
End: 2023-05-24
Payer: COMMERCIAL

## 2023-05-31 ENCOUNTER — APPOINTMENT (OUTPATIENT)
Dept: PHYSICAL THERAPY | Facility: REHABILITATION | Age: 68
End: 2023-05-31
Payer: COMMERCIAL

## 2025-06-02 NOTE — LETTER
Jamshid returning calling to state just received fax, asking to have the pages refaxed to [phone].    March 7, 2023     Patient: Lorena Wilkerson  YOB: 1955  Date of Visit: 3/6/2023      To Whom it May Concern:    Lorena Wilkerson is under my professional care  She is not yet cleared for work until further evaluation    If you have any questions or concerns, please don't hesitate to call           Sincerely,          Terrell Shipman MD        CC: No Recipients

## (undated) DEVICE — SPECIMEN CONTAINER STERILE PEEL PACK

## (undated) DEVICE — GLOVE SRG BIOGEL 6

## (undated) DEVICE — STOCKINETTE REGULAR

## (undated) DEVICE — PLUMEPEN PRO 10FT

## (undated) DEVICE — ACE WRAP 4 IN UNSTERILE

## (undated) DEVICE — CHLORAPREP HI-LITE 26ML ORANGE

## (undated) DEVICE — SCD SEQUENTIAL COMPRESSION COMFORT SLEEVE MEDIUM KNEE LENGTH: Brand: KENDALL SCD

## (undated) DEVICE — GLOVE INDICATOR PI UNDERGLOVE SZ 7 BLUE

## (undated) DEVICE — SYRINGE 10ML LL

## (undated) DEVICE — SYRINGE 3ML LL

## (undated) DEVICE — CUFF TOURNIQUET 18 X 4 IN QUICK CONNECT DISP 1 BLADDER

## (undated) DEVICE — INTENDED FOR TISSUE SEPARATION, AND OTHER PROCEDURES THAT REQUIRE A SHARP SURGICAL BLADE TO PUNCTURE OR CUT.: Brand: BARD-PARKER ® CARBON RIB-BACK BLADES

## (undated) DEVICE — LUBRICANT SURGILUBE TUBE 4 OZ  FLIP TOP

## (undated) DEVICE — STRETCH BANDAGE: Brand: CURITY

## (undated) DEVICE — SUT VICRYL 3-0 PS-2 27 IN J427H

## (undated) DEVICE — CATH URET .038 10FR 50CM DUAL LUMEN

## (undated) DEVICE — STRL COTTON TIP APPLCTR 6IN PK: Brand: CARDINAL HEALTH

## (undated) DEVICE — SYRINGE 20ML LL

## (undated) DEVICE — NEEDLE 18 G X 1 1/2

## (undated) DEVICE — LASER FIBER HOLMIUM 272MICRON

## (undated) DEVICE — 3000CC GUARDIAN II: Brand: GUARDIAN

## (undated) DEVICE — BLADE ENDO TRAC

## (undated) DEVICE — GLOVE SRG BIOGEL 7.5

## (undated) DEVICE — 2000CC GUARDIAN II: Brand: GUARDIAN

## (undated) DEVICE — SHEATH URETERAL ACCESS 10/12FR 35CM PROXIS

## (undated) DEVICE — BETHLEHEM UNIVERSAL  MIONR EXT: Brand: CARDINAL HEALTH

## (undated) DEVICE — UROCATCH BAG

## (undated) DEVICE — GUIDEWIRE STRGHT TIP 0.035 IN  SOLO PLUS

## (undated) DEVICE — ANTI-FOG SOLUTION WITH FOAM PAD: Brand: DEVON

## (undated) DEVICE — TRANSPOSAL ULTRAFLEX DUO/QUAD ULTRA CART MANIFOLD

## (undated) DEVICE — URETERAL CATHETER POLLACK OPEN ENDED 5FR

## (undated) DEVICE — NEEDLE 25G X 1 1/2

## (undated) DEVICE — ENDOSCOPIC VALVE WITH ADAPTER.: Brand: SURSEAL® II

## (undated) DEVICE — 10FR FRAZIER SUCTION HANDLE: Brand: CARDINAL HEALTH

## (undated) DEVICE — KERLIX BANDAGE ROLL: Brand: KERLIX

## (undated) DEVICE — SUPER SPONGES,MEDIUM: Brand: KERLIX

## (undated) DEVICE — GLOVE SRG BIOGEL 7

## (undated) DEVICE — PREMIUM DRY TRAY LF: Brand: MEDLINE INDUSTRIES, INC.

## (undated) DEVICE — EXIDINE 4 PCT

## (undated) DEVICE — URETERAL CATHETER ADAPTOR TIP

## (undated) DEVICE — GAUZE SPONGES,16 PLY: Brand: CURITY

## (undated) DEVICE — 3M™ STERI-STRIP™ COMPOUND BENZOIN TINCTURE 40 BAGS/CARTON 4 CARTONS/CASE C1544: Brand: 3M™ STERI-STRIP™

## (undated) DEVICE — TUBING SUCTION 5MM X 12 FT

## (undated) DEVICE — 3M™ STERI-STRIP™ REINFORCED ADHESIVE SKIN CLOSURES, R1546, 1/4 IN X 4 IN (6 MM X 100 MM), 10 STRIPS/ENVELOPE: Brand: 3M™ STERI-STRIP™

## (undated) DEVICE — CAST PADDING 4 IN SYNTHETIC NON-STRL

## (undated) DEVICE — MASTISOL LIQ ADHESIVE 2/3ML

## (undated) DEVICE — PACK TUR

## (undated) DEVICE — SUT ETHILON 4-0 PS-2 18 IN 1667H

## (undated) DEVICE — OCCLUSIVE GAUZE STRIP,3% BISMUTH TRIBROMOPHENATE IN PETROLATUM BLEND: Brand: XEROFORM

## (undated) DEVICE — SUT ETHILON 3-0 PS-1 18 IN 1663G